# Patient Record
Sex: MALE | Race: WHITE | HISPANIC OR LATINO | Employment: OTHER | URBAN - METROPOLITAN AREA
[De-identification: names, ages, dates, MRNs, and addresses within clinical notes are randomized per-mention and may not be internally consistent; named-entity substitution may affect disease eponyms.]

---

## 2017-01-27 ENCOUNTER — HOSPITAL ENCOUNTER (INPATIENT)
Facility: MEDICAL CENTER | Age: 47
LOS: 2 days | DRG: 392 | End: 2017-01-30
Attending: EMERGENCY MEDICINE | Admitting: HOSPITALIST
Payer: COMMERCIAL

## 2017-01-27 DIAGNOSIS — K92.2 UPPER GI BLEED: ICD-10-CM

## 2017-01-27 PROCEDURE — 99285 EMERGENCY DEPT VISIT HI MDM: CPT

## 2017-01-27 ASSESSMENT — PAIN SCALES - GENERAL: PAINLEVEL_OUTOF10: 8

## 2017-01-27 NOTE — IP AVS SNAPSHOT
" After Visit Summary                                                                                                                  Name:Temo Bryan  Medical Record Number:[id number]  CSN: [id number]    YOB: 1970   Age: 46 y.o.  Sex: male  HT:1.765 m (5' 9.49\") WT: 91.2 kg (201 lb 1 oz)          Admit Date: 1/27/2017     Discharge Date:   Today's Date: 1/30/2017  Attending Doctor:  Justice Sykes M.D.                  Allergies:  Morphine            Discharge Instructions       Discharge Instructions    Discharged to home by car with self. Discharged via ambulance, hospital escort: Refused.  Special equipment needed: Not Applicable    Be sure to schedule a follow-up appointment with your primary care doctor or any specialists as instructed.     Discharge Plan:   Diet Plan: Discussed  Activity Level: Discussed  Smoking Cessation Offered: Patient Refused  Confirmed Follow up Appointment: Patient to Call and Schedule Appointment  Pneumococcal Vaccine Given - only chart on this line when given: Given (See MAR)  Influenza Vaccine Indication: Patient Refuses    I understand that a diet low in cholesterol, fat, and sodium is recommended for good health. Unless I have been given specific instructions below for another diet, I accept this instruction as my diet prescription.   Other diet: GI soft as tolerated    Special Instructions: None    · Is patient discharged on Warfarin / Coumadin?   No     · Is patient Post Blood Transfusion?  No    Depression / Suicide Risk    As you are discharged from this [facility] facility, it is important to learn how to keep safe from harming yourself.    Recognize the warning signs:  · Abrupt changes in personality, positive or negative- including increase in energy   · Giving away possessions  · Change in eating patterns- significant weight changes-  positive or negative  · Change in sleeping patterns- unable to sleep or sleeping all the time   · Unwillingness or " inability to communicate  · Depression  · Unusual sadness, discouragement and loneliness  · Talk of wanting to die  · Neglect of personal appearance   · Rebelliousness- reckless behavior  · Withdrawal from people/activities they love  · Confusion- inability to concentrate     If you or a loved one observes any of these behaviors or has concerns about self-harm, here's what you can do:  · Talk about it- your feelings and reasons for harming yourself  · Remove any means that you might use to hurt yourself (examples: pills, rope, extension cords, firearm)  · Get professional help from the community (Mental Health, Substance Abuse, psychological counseling)  · Do not be alone:Call your Safe Contact- someone whom you trust who will be there for you.  · Call your local CRISIS HOTLINE 258-7308 or 895-337-1979  · Call your local Children's Mobile Crisis Response Team [place] (850) 460-1795 or www.Illumagear  · Call the toll free National Suicide Prevention Hotlines   · National Suicide Prevention Lifeline 235-177-PKBE (9773)  · [employer] 800-SUICIDE (222-5739)    Gastrointestinal Bleeding  Gastrointestinal bleeding is bleeding somewhere along the path that food travels through the body (digestive tract). This path is anywhere between the mouth and the opening of the butt (anus). You may have blood in your throw up (vomit) or in your poop (stools). If there is a lot of bleeding, you may need to stay in the hospital.  HOME CARE  · Only take medicine as told by your doctor.  · Eat foods with fiber such as whole grains, fruits, and vegetables. You can also try eating 1 to 3 prunes a day.  · Drink enough fluids to keep your pee (urine) clear or pale yellow.  GET HELP RIGHT AWAY IF:   · Your bleeding gets worse.  · You feel dizzy, weak, or you pass out (faint).  · You have bad cramps in your back or belly (abdomen).  · You have large blood clumps (clots) in your poop.  · Your problems are getting  worse.  MAKE SURE YOU:   · Understand these instructions.  · Will watch your condition.  · Will get help right away if you are not doing well or get worse.     This information is not intended to replace advice given to you by your health care provider. Make sure you discuss any questions you have with your health care provider.     Document Released: 09/26/2009 Document Revised: 12/04/2013 Document Reviewed: 11/26/2012  [employer] Interactive Patient Education ©2016 [employer]        Follow-up Information     1. Follow up with Pcp Pt States None In 1 week.    Specialty:  Family Medicine        2. Follow up with Temo Anderson M.D. In 1 week.    Specialty:  Gastroenterology    Contact information    [address] [address]  [phone]          3. Follow up In 1 week.    [facility]  [facility] If needed, [facility] if necessary.         Discharge Medication Instructions:    Below are the medications your physician expects you to take upon discharge:    Review all your home medications and newly ordered medications with your doctor and/or pharmacist. Follow medication instructions as directed by your doctor and/or pharmacist.    Please keep your medication list with you and share with your physician.               Medication List      START taking these medications        Instructions    omeprazole 40 MG delayed-release capsule   Last time this was given:  40 mg on 1/30/2017  8:00 AM   Commonly known as:  PRILOSEC    Take 1 Cap by mouth 2 Times a Day.   Dose:  40 mg       sucralfate 1 GM/10ML Susp   Last time this was given:  1 g on 1/30/2017  5:47 AM   Commonly known as:  CARAFATE    Take 10 mL by mouth 4 Times a Day,Before Meals and at Bedtime for 14 days.   Dose:  1 g         CONTINUE taking these medications        Instructions    acamprosate 333 MG tablet   Commonly known as:  CAMPRAL    Take 666 mg by mouth 3 times a day.   Dose:  666 mg        calcium carbonate 500 MG Tabs   Commonly known as:  OS-JORDY 500    Take 1,000 mg by mouth every morning with breakfast.   Dose:  1000 mg       escitalopram 10 MG Tabs   Last time this was given:  10 mg on 1/30/2017  8:01 AM   Commonly known as:  LEXAPRO    Take 10 mg by mouth every day. Indications: Posttraumatic Stress Disorder   Dose:  10 mg       hydrOXYzine 25 MG Tabs   Last time this was given:  25 mg on 1/29/2017 11:21 PM   Commonly known as:  ATARAX    Take 25 mg by mouth 3 times a day as needed for Itching or Anxiety.   Dose:  25 mg       multivitamin Tabs   Last time this was given:  1 Tab on 1/30/2017  8:00 AM    Take 1 Tab by mouth every day.   Dose:  1 Tab       prazosin 2 MG Caps   Last time this was given:  1/28/2017  8:09 PM   Commonly known as:  MINIPRESS    Take 6 mg by mouth every evening. Indications: PTSD   Dose:  6 mg       thiamine 100 MG Tabs   Last time this was given:  100 mg on 1/30/2017  8:01 AM   Commonly known as:  THIAMINE    Take 100 mg by mouth every day.   Dose:  100 mg       tramadol 50 MG Tabs   Last time this was given:  100 mg on 1/29/2017 10:12 PM   Commonly known as:  ULTRAM    Take 100 mg by mouth every 6 hours as needed.   Dose:  100 mg       trazodone 150 MG Tabs   Last time this was given:  150 mg on 1/29/2017 10:03 PM   Commonly known as:  DESYREL    Take 150 mg by mouth every evening.   Dose:  150 mg               Instructions           Diet / Nutrition:    Follow any diet instructions given to you by your doctor or the dietician, including how much salt (sodium) you are allowed each day.    If you are overweight, talk to your doctor about a weight reduction plan.    Activity:    Remain physically active following your doctor's instructions about exercise and activity.    Rest often.     Any time you become even a little tired or short of breath, SIT DOWN and rest.    Worsening Symptoms:    Report any of the following signs and symptoms to the doctor's office  immediately:    *Pain of jaw, arm, or neck  *Chest pain not relieved by medication                               *Dizziness or loss of consciousness  *Difficulty breathing even when at rest   *More tired than usual                                       *Bleeding drainage or swelling of surgical site  *Swelling of feet, ankles, legs or stomach                 *Fever (>100ºF)  *Pink or blood tinged sputum  *Weight gain (3lbs/day or 5lbs /week)           *Shock from internal defibrillator (if applicable)  *Palpitations or irregular heartbeats                *Cool and/or numb extremities    Stroke Awareness    Common Risk Factors for Stroke include:    Age  Atrial Fibrillation  Carotid Artery Stenosis  Diabetes Mellitus  Excessive alcohol consumption  High blood pressure  Overweight   Physical inactivity  Smoking    Warning signs and symptoms of a stroke include:    *Sudden numbness or weakness of the face, arm or leg (especially on one side of the body).  *Sudden confusion, trouble speaking or understanding.  *Sudden trouble seeing in one or both eyes.  *Sudden trouble walking, dizziness, loss of balance or coordination.Sudden severe headache with no known cause.    It is very important to get treatment quickly when a stroke occurs. If you experience any of the above warning signs, call 911 immediately.                   Disclaimer         Quit Smoking / Tobacco Use:    I understand the use of any tobacco products increases my chance of suffering from future heart disease or stroke and could cause other illnesses which may shorten my life. Quitting the use of tobacco products is the single most important thing I can do to improve my health. For further information on smoking / tobacco cessation call a Toll Free Quit Line at 1-369.862.7317 (*National Cancer [place]) or 1-299.810.3698 (American Lung Association) or you can access the web based program at www.lungusa.org.    [place] Tobacco Users Help Line:  (661)  878-9352       Toll Free: 0-862-730-0530  Quit Tobacco Program [facility] (601)070-6435    Crisis Hotline:    [place] Crisis Hotline:  4-760-UMGOKWA or 1-357.990.8433    [place] Crisis Hotline:    1-275.757.8337 or 951-005-5880    Discharge Survey:   Thank you for choosing [facility]. We hope we did everything we could to make your hospital stay a pleasant one. You may be receiving a phone survey and we would appreciate your time and participation in answering the questions. Your input is very valuable to us in our efforts to improve our service to our patients and their families.        My signature on this form indicates that:    1. I have reviewed and understand the above information.  2. My questions regarding this information have been answered to my satisfaction.  3. I have formulated a plan with my discharge nurse to obtain my prescribed medications for home.                  Disclaimer         __________________________________                     __________       ________                       Patient Signature                                                 Date                    Time

## 2017-01-27 NOTE — IP AVS SNAPSHOT
" <p align=\"LEFT\"><IMG SRC=\"//[email or web address]$/[email or web address]\" alt=\"Image\" WIDTH=\"50%\" HEIGHT=\"200\" BORDER=\"\"></p>                   Name:Temo Bryan  Medical Record Number:[id number]  CSN: [id number]    YOB: 1970   Age: 46 y.o.  Sex: male  HT:1.765 m (5' 9.49\") WT: 91.2 kg (201 lb 1 oz)          Admit Date: 1/27/2017     Discharge Date:   Today's Date: 1/30/2017  Attending Doctor:  Justice Sykse M.D.                  Allergies:  Morphine          Follow-up Information     1. Follow up with Pcp Pt States None In 1 week.    Specialty:  Family Medicine        2. Follow up with Temo Anderson M.D. In 1 week.    Specialty:  Gastroenterology    Contact information    [address] [place] [address]  [phone]          3. Follow up In 1 week.    [facility]:  [facility]. If needed, [facility] if necessary.         Medication List      Take these Medications        Instructions    acamprosate 333 MG tablet   Commonly known as:  CAMPRAL    Take 666 mg by mouth 3 times a day.   Dose:  666 mg       calcium carbonate 500 MG Tabs   Commonly known as:  OS-JORDY 500    Take 1,000 mg by mouth every morning with breakfast.   Dose:  1000 mg       escitalopram 10 MG Tabs   Commonly known as:  LEXAPRO    Take 10 mg by mouth every day. Indications: Posttraumatic Stress Disorder   Dose:  10 mg       hydrOXYzine 25 MG Tabs   Commonly known as:  ATARAX    Take 25 mg by mouth 3 times a day as needed for Itching or Anxiety.   Dose:  25 mg       multivitamin Tabs    Take 1 Tab by mouth every day.   Dose:  1 Tab       omeprazole 40 MG delayed-release capsule   Commonly known as:  PRILOSEC    Take 1 Cap by mouth 2 Times a Day.   Dose:  40 mg       prazosin 2 MG Caps   Commonly known as:  MINIPRESS    Take 6 mg by mouth every evening. Indications: PTSD   Dose:  6 mg       sucralfate 1 GM/10ML Susp   Commonly known as:  CARAFATE    Take 10 mL by mouth 4 Times a Day,Before Meals " and at Bedtime for 14 days.   Dose:  1 g       thiamine 100 MG Tabs   Commonly known as:  THIAMINE    Take 100 mg by mouth every day.   Dose:  100 mg       tramadol 50 MG Tabs   Commonly known as:  ULTRAM    Take 100 mg by mouth every 6 hours as needed.   Dose:  100 mg       trazodone 150 MG Tabs   Commonly known as:  DESYREL    Take 150 mg by mouth every evening.   Dose:  150 mg

## 2017-01-27 NOTE — IP AVS SNAPSHOT
1/30/2017          Temo Bryan  [address]  [place] [address]    Dear Temo:    [facility] wants to ensure your discharge home is safe and you or your loved ones have had all your questions answered regarding your care after you leave the hospital.    You may receive a telephone call within two days of your discharge.  This call is to make certain you understand your discharge instructions as well as ensure we provided you with the best care possible during your stay with us.     The call will only last approximately 3-5 minutes and will be done by a nurse.    Once again, we want to ensure your discharge home is safe and that you have a clear understanding of any next steps in your care.  If you have any questions or concerns, please do not hesitate to contact us, we are here for you.  Thank you for choosing [facility] for your healthcare needs.    Sincerely,    Abisai Ragsdale    [facility]

## 2017-01-27 NOTE — IP AVS SNAPSHOT
Project 2020 Access Code: [email or web address]  Expires: 3/1/2017 12:32 PM    Your email address is not on file at [facility].  Email Addresses are required for you to sign up for Project 2020, please contact [phone] to verify your personal information and to provide your email address prior to attempting to register for Project 2020.    Temo Bryan  [address]  [place] [address]    Project 2020  A secure, online tool to manage your health information     [facility]’s Project 2020® is a secure, online tool that connects you to your personalized health information from the privacy of your home -- day or night - making it very easy for you to manage your healthcare. Once the activation process is completed, you can even access your medical information using the Project 2020 joshua, which is available for free in the Apple Joshua store or Google Play store.     To learn more about Project 2020, visit [email or web address]    There are two levels of access available (as shown below):   My Chart Features  [facility] [place] [facility] Primary Care Doctor [facility]  Specialists [facility]  Urgent  Care Non-[facility] Primary Care Doctor   Email your healthcare team securely and privately 24/7 X X X    Manage appointments: schedule your next appointment; view details of past/upcoming appointments X      Request prescription refills. X      View recent personal medical records, including lab and immunizations X X X X   View health record, including health history, allergies, medications X X X X   Read reports about your outpatient visits, procedures, consult and ER notes X X X X   See your discharge summary, which is a recap of your hospital and/or ER visit that includes your diagnosis, lab results, and care plan X X  X     How to register for [employer] [employer]:  Once your e-mail address has been verified, follow the following steps to sign up for [employer] [employer].     1. Go to  [email or web address]  2. Click on the Sign Up Now box, which takes you to the New Member Sign Up  page. You will need to provide the following information:  a. Enter your [employer] Access Code exactly as it appears at the top of this page. (You will not need to use this code after you’ve completed the sign-up process. If you do not sign up before the expiration date, you must request a new code.)   b. Enter your date of birth.   c. Enter your home email address.   d. Click Submit, and follow the next screen’s instructions.  3. Create a [employer] P[employer] ID. This will be your [employer] login ID and cannot be changed, so think of one that is secure and easy to remember.  4. Create a [facility] [employer] password. You can change your password at any time.  5. Enter your Password Reset Question and Answer. This can be used at a later time if you forget your password.   6. Enter your e-mail address. This allows you to receive e-mail notifications when new information is available in [employer].  7. Click Sign Up. You can now view your health information.    For assistance activating your [employer] account, call [phone]

## 2017-01-28 ENCOUNTER — RESOLUTE PROFESSIONAL BILLING HOSPITAL PROF FEE (OUTPATIENT)
Dept: HOSPITALIST | Facility: MEDICAL CENTER | Age: 47
End: 2017-01-28
Payer: COMMERCIAL

## 2017-01-28 PROBLEM — K92.2 GI BLEED: Status: ACTIVE | Noted: 2017-01-28

## 2017-01-28 LAB
ABO GROUP BLD: NORMAL
ABO GROUP BLD: NORMAL
ALBUMIN SERPL BCP-MCNC: 3.5 G/DL (ref 3.2–4.9)
ALBUMIN/GLOB SERPL: 1.2 G/DL
ALP SERPL-CCNC: 70 U/L (ref 30–99)
ALT SERPL-CCNC: 17 U/L (ref 2–50)
ANION GAP SERPL CALC-SCNC: 12 MMOL/L (ref 0–11.9)
AST SERPL-CCNC: 20 U/L (ref 12–45)
BASOPHILS # BLD AUTO: 0.7 % (ref 0–1.8)
BASOPHILS # BLD: 0.06 K/UL (ref 0–0.12)
BILIRUB SERPL-MCNC: 0.5 MG/DL (ref 0.1–1.5)
BLD GP AB SCN SERPL QL: NORMAL
BUN SERPL-MCNC: 21 MG/DL (ref 8–22)
CALCIUM SERPL-MCNC: 8.3 MG/DL (ref 8.5–10.5)
CHLORIDE SERPL-SCNC: 100 MMOL/L (ref 96–112)
CO2 SERPL-SCNC: 26 MMOL/L (ref 20–33)
CREAT SERPL-MCNC: 0.68 MG/DL (ref 0.5–1.4)
EKG IMPRESSION: NORMAL
EOSINOPHIL # BLD AUTO: 0.25 K/UL (ref 0–0.51)
EOSINOPHIL NFR BLD: 3 % (ref 0–6.9)
ERYTHROCYTE [DISTWIDTH] IN BLOOD BY AUTOMATED COUNT: 47.8 FL (ref 35.9–50)
GFR SERPL CREATININE-BSD FRML MDRD: >60 ML/MIN/1.73 M 2
GLOBULIN SER CALC-MCNC: 3 G/DL (ref 1.9–3.5)
GLUCOSE SERPL-MCNC: 100 MG/DL (ref 65–99)
HCT VFR BLD AUTO: 41.8 % (ref 42–52)
HGB BLD-MCNC: 13.7 G/DL (ref 14–18)
IMM GRANULOCYTES # BLD AUTO: 0.03 K/UL (ref 0–0.11)
IMM GRANULOCYTES NFR BLD AUTO: 0.4 % (ref 0–0.9)
LYMPHOCYTES # BLD AUTO: 3.35 K/UL (ref 1–4.8)
LYMPHOCYTES NFR BLD: 40 % (ref 22–41)
MCH RBC QN AUTO: 29.6 PG (ref 27–33)
MCHC RBC AUTO-ENTMCNC: 32.8 G/DL (ref 33.7–35.3)
MCV RBC AUTO: 90.3 FL (ref 81.4–97.8)
MONOCYTES # BLD AUTO: 0.66 K/UL (ref 0–0.85)
MONOCYTES NFR BLD AUTO: 7.9 % (ref 0–13.4)
NEUTROPHILS # BLD AUTO: 4.03 K/UL (ref 1.82–7.42)
NEUTROPHILS NFR BLD: 48 % (ref 44–72)
NRBC # BLD AUTO: 0 K/UL
NRBC BLD AUTO-RTO: 0 /100 WBC
PLATELET # BLD AUTO: 166 K/UL (ref 164–446)
PMV BLD AUTO: 10.3 FL (ref 9–12.9)
POTASSIUM SERPL-SCNC: 3.3 MMOL/L (ref 3.6–5.5)
PROT SERPL-MCNC: 6.5 G/DL (ref 6–8.2)
RBC # BLD AUTO: 4.63 M/UL (ref 4.7–6.1)
RH BLD: NORMAL
SODIUM SERPL-SCNC: 138 MMOL/L (ref 135–145)
WBC # BLD AUTO: 8.4 K/UL (ref 4.8–10.8)

## 2017-01-28 PROCEDURE — 700105 HCHG RX REV CODE 258: Performed by: EMERGENCY MEDICINE

## 2017-01-28 PROCEDURE — 700102 HCHG RX REV CODE 250 W/ 637 OVERRIDE(OP): Performed by: HOSPITALIST

## 2017-01-28 PROCEDURE — 88305 TISSUE EXAM BY PATHOLOGIST: CPT | Mod: 59

## 2017-01-28 PROCEDURE — 700111 HCHG RX REV CODE 636 W/ 250 OVERRIDE (IP)

## 2017-01-28 PROCEDURE — C9113 INJ PANTOPRAZOLE SODIUM, VIA: HCPCS | Performed by: HOSPITALIST

## 2017-01-28 PROCEDURE — C9113 INJ PANTOPRAZOLE SODIUM, VIA: HCPCS | Performed by: EMERGENCY MEDICINE

## 2017-01-28 PROCEDURE — 500066 HCHG BITE BLOCK, ECT: Performed by: INTERNAL MEDICINE

## 2017-01-28 PROCEDURE — 85025 COMPLETE CBC W/AUTO DIFF WBC: CPT

## 2017-01-28 PROCEDURE — 502240 HCHG MISC OR SUPPLY RC 0272: Performed by: INTERNAL MEDICINE

## 2017-01-28 PROCEDURE — 500122 HCHG BOVIE, BLADE: Performed by: INTERNAL MEDICINE

## 2017-01-28 PROCEDURE — 700102 HCHG RX REV CODE 250 W/ 637 OVERRIDE(OP): Performed by: INTERNAL MEDICINE

## 2017-01-28 PROCEDURE — HZ2ZZZZ DETOXIFICATION SERVICES FOR SUBSTANCE ABUSE TREATMENT: ICD-10-PCS | Performed by: HOSPITALIST

## 2017-01-28 PROCEDURE — 160009 HCHG ANES TIME/MIN: Performed by: INTERNAL MEDICINE

## 2017-01-28 PROCEDURE — 160048 HCHG OR STATISTICAL LEVEL 1-5: Performed by: INTERNAL MEDICINE

## 2017-01-28 PROCEDURE — 700102 HCHG RX REV CODE 250 W/ 637 OVERRIDE(OP)

## 2017-01-28 PROCEDURE — 0DB38ZX EXCISION OF LOWER ESOPHAGUS, VIA NATURAL OR ARTIFICIAL OPENING ENDOSCOPIC, DIAGNOSTIC: ICD-10-PCS | Performed by: INTERNAL MEDICINE

## 2017-01-28 PROCEDURE — 86901 BLOOD TYPING SEROLOGIC RH(D): CPT

## 2017-01-28 PROCEDURE — 96365 THER/PROPH/DIAG IV INF INIT: CPT

## 2017-01-28 PROCEDURE — 99223 1ST HOSP IP/OBS HIGH 75: CPT | Performed by: HOSPITALIST

## 2017-01-28 PROCEDURE — 700111 HCHG RX REV CODE 636 W/ 250 OVERRIDE (IP): Performed by: EMERGENCY MEDICINE

## 2017-01-28 PROCEDURE — 700101 HCHG RX REV CODE 250

## 2017-01-28 PROCEDURE — 96375 TX/PRO/DX INJ NEW DRUG ADDON: CPT

## 2017-01-28 PROCEDURE — A9270 NON-COVERED ITEM OR SERVICE: HCPCS

## 2017-01-28 PROCEDURE — 0DB68ZX EXCISION OF STOMACH, VIA NATURAL OR ARTIFICIAL OPENING ENDOSCOPIC, DIAGNOSTIC: ICD-10-PCS | Performed by: INTERNAL MEDICINE

## 2017-01-28 PROCEDURE — 93010 ELECTROCARDIOGRAM REPORT: CPT | Performed by: INTERNAL MEDICINE

## 2017-01-28 PROCEDURE — A9270 NON-COVERED ITEM OR SERVICE: HCPCS | Performed by: HOSPITALIST

## 2017-01-28 PROCEDURE — 93010 ELECTROCARDIOGRAM REPORT: CPT | Mod: 77 | Performed by: INTERNAL MEDICINE

## 2017-01-28 PROCEDURE — 93005 ELECTROCARDIOGRAM TRACING: CPT

## 2017-01-28 PROCEDURE — 86900 BLOOD TYPING SEROLOGIC ABO: CPT

## 2017-01-28 PROCEDURE — 110382 HCHG SHELL REV 271: Performed by: INTERNAL MEDICINE

## 2017-01-28 PROCEDURE — 160035 HCHG PACU - 1ST 60 MINS PHASE I: Performed by: INTERNAL MEDICINE

## 2017-01-28 PROCEDURE — 160002 HCHG RECOVERY MINUTES (STAT): Performed by: INTERNAL MEDICINE

## 2017-01-28 PROCEDURE — 770020 HCHG ROOM/CARE - TELE (206)

## 2017-01-28 PROCEDURE — 80053 COMPREHEN METABOLIC PANEL: CPT

## 2017-01-28 PROCEDURE — A9270 NON-COVERED ITEM OR SERVICE: HCPCS | Performed by: INTERNAL MEDICINE

## 2017-01-28 PROCEDURE — 700105 HCHG RX REV CODE 258: Performed by: HOSPITALIST

## 2017-01-28 PROCEDURE — 93005 ELECTROCARDIOGRAM TRACING: CPT | Performed by: HOSPITALIST

## 2017-01-28 PROCEDURE — 700111 HCHG RX REV CODE 636 W/ 250 OVERRIDE (IP): Performed by: HOSPITALIST

## 2017-01-28 PROCEDURE — 700101 HCHG RX REV CODE 250: Performed by: HOSPITALIST

## 2017-01-28 PROCEDURE — 88312 SPECIAL STAINS GROUP 1: CPT

## 2017-01-28 PROCEDURE — 160203 HCHG ENDO MINUTES - 1ST 30 MINS LEVEL 4: Performed by: INTERNAL MEDICINE

## 2017-01-28 PROCEDURE — 302128 INFUSION PUMP: Performed by: EMERGENCY MEDICINE

## 2017-01-28 PROCEDURE — 86850 RBC ANTIBODY SCREEN: CPT

## 2017-01-28 PROCEDURE — 96368 THER/DIAG CONCURRENT INF: CPT

## 2017-01-28 PROCEDURE — 302128 INFUSION PUMP: Performed by: HOSPITALIST

## 2017-01-28 PROCEDURE — 96366 THER/PROPH/DIAG IV INF ADDON: CPT

## 2017-01-28 PROCEDURE — A4606 OXYGEN PROBE USED W OXIMETER: HCPCS | Performed by: INTERNAL MEDICINE

## 2017-01-28 RX ORDER — NICOTINE 21 MG/24HR
1 PATCH, TRANSDERMAL 24 HOURS TRANSDERMAL EVERY 24 HOURS
COMMUNITY
End: 2017-01-28

## 2017-01-28 RX ORDER — HYDROXYZINE HYDROCHLORIDE 25 MG/1
25 TABLET, FILM COATED ORAL 3 TIMES DAILY PRN
COMMUNITY

## 2017-01-28 RX ORDER — LORAZEPAM 1 MG/1
0.5 TABLET ORAL EVERY 4 HOURS PRN
Status: DISCONTINUED | OUTPATIENT
Start: 2017-01-28 | End: 2017-01-30 | Stop reason: HOSPADM

## 2017-01-28 RX ORDER — ESCITALOPRAM OXALATE 10 MG/1
10 TABLET ORAL DAILY
Status: DISCONTINUED | OUTPATIENT
Start: 2017-01-28 | End: 2017-01-30 | Stop reason: HOSPADM

## 2017-01-28 RX ORDER — SUCRALFATE ORAL 1 G/10ML
1 SUSPENSION ORAL
Status: DISCONTINUED | OUTPATIENT
Start: 2017-01-28 | End: 2017-01-30 | Stop reason: HOSPADM

## 2017-01-28 RX ORDER — PRAZOSIN HYDROCHLORIDE 2 MG/1
6 CAPSULE ORAL NIGHTLY
COMMUNITY

## 2017-01-28 RX ORDER — OMEPRAZOLE 20 MG/1
40 CAPSULE, DELAYED RELEASE ORAL 2 TIMES DAILY
Status: DISCONTINUED | OUTPATIENT
Start: 2017-01-28 | End: 2017-01-30 | Stop reason: HOSPADM

## 2017-01-28 RX ORDER — ONDANSETRON 2 MG/ML
4 INJECTION INTRAMUSCULAR; INTRAVENOUS EVERY 4 HOURS PRN
Status: DISCONTINUED | OUTPATIENT
Start: 2017-01-28 | End: 2017-01-28

## 2017-01-28 RX ORDER — OXYCODONE AND ACETAMINOPHEN 10; 325 MG/1; MG/1
TABLET ORAL
Status: COMPLETED
Start: 2017-01-28 | End: 2017-01-28

## 2017-01-28 RX ORDER — LORAZEPAM 2 MG/ML
1.5 INJECTION INTRAMUSCULAR
Status: DISCONTINUED | OUTPATIENT
Start: 2017-01-28 | End: 2017-01-30 | Stop reason: HOSPADM

## 2017-01-28 RX ORDER — PROMETHAZINE HYDROCHLORIDE 25 MG/1
12.5-25 TABLET ORAL EVERY 4 HOURS PRN
Status: DISCONTINUED | OUTPATIENT
Start: 2017-01-28 | End: 2017-01-30 | Stop reason: HOSPADM

## 2017-01-28 RX ORDER — TRAMADOL HYDROCHLORIDE 50 MG/1
100 TABLET ORAL EVERY 6 HOURS PRN
COMMUNITY

## 2017-01-28 RX ORDER — TRAZODONE HYDROCHLORIDE 150 MG/1
150 TABLET ORAL NIGHTLY
COMMUNITY

## 2017-01-28 RX ORDER — ACAMPROSATE CALCIUM 333 MG/1
666 TABLET, DELAYED RELEASE ORAL 3 TIMES DAILY
Status: DISCONTINUED | OUTPATIENT
Start: 2017-01-28 | End: 2017-01-30 | Stop reason: HOSPADM

## 2017-01-28 RX ORDER — FOLIC ACID 1 MG/1
1 TABLET ORAL DAILY
Status: DISCONTINUED | OUTPATIENT
Start: 2017-01-29 | End: 2017-01-30 | Stop reason: HOSPADM

## 2017-01-28 RX ORDER — PROMETHAZINE HYDROCHLORIDE 25 MG/1
12.5-25 SUPPOSITORY RECTAL EVERY 4 HOURS PRN
Status: DISCONTINUED | OUTPATIENT
Start: 2017-01-28 | End: 2017-01-30 | Stop reason: HOSPADM

## 2017-01-28 RX ORDER — LORAZEPAM 1 MG/1
1 TABLET ORAL EVERY 4 HOURS PRN
Status: DISCONTINUED | OUTPATIENT
Start: 2017-01-28 | End: 2017-01-30 | Stop reason: HOSPADM

## 2017-01-28 RX ORDER — HYDROXYZINE HYDROCHLORIDE 25 MG/1
25 TABLET, FILM COATED ORAL 3 TIMES DAILY PRN
Status: DISCONTINUED | OUTPATIENT
Start: 2017-01-28 | End: 2017-01-30 | Stop reason: HOSPADM

## 2017-01-28 RX ORDER — ONDANSETRON 2 MG/ML
4 INJECTION INTRAMUSCULAR; INTRAVENOUS EVERY 4 HOURS PRN
Status: DISCONTINUED | OUTPATIENT
Start: 2017-01-28 | End: 2017-01-30 | Stop reason: HOSPADM

## 2017-01-28 RX ORDER — LORAZEPAM 2 MG/ML
1 INJECTION INTRAMUSCULAR
Status: DISCONTINUED | OUTPATIENT
Start: 2017-01-28 | End: 2017-01-30 | Stop reason: HOSPADM

## 2017-01-28 RX ORDER — LORAZEPAM 1 MG/1
2 TABLET ORAL
Status: DISCONTINUED | OUTPATIENT
Start: 2017-01-28 | End: 2017-01-30 | Stop reason: HOSPADM

## 2017-01-28 RX ORDER — SODIUM CHLORIDE AND POTASSIUM CHLORIDE 150; 900 MG/100ML; MG/100ML
INJECTION, SOLUTION INTRAVENOUS CONTINUOUS
Status: DISCONTINUED | OUTPATIENT
Start: 2017-01-28 | End: 2017-01-30 | Stop reason: HOSPADM

## 2017-01-28 RX ORDER — ESCITALOPRAM OXALATE 10 MG/1
10 TABLET ORAL DAILY
COMMUNITY

## 2017-01-28 RX ORDER — TRAZODONE HYDROCHLORIDE 50 MG/1
150 TABLET ORAL NIGHTLY
Status: DISCONTINUED | OUTPATIENT
Start: 2017-01-28 | End: 2017-01-30 | Stop reason: HOSPADM

## 2017-01-28 RX ORDER — LORAZEPAM 2 MG/ML
0.5 INJECTION INTRAMUSCULAR EVERY 4 HOURS PRN
Status: DISCONTINUED | OUTPATIENT
Start: 2017-01-28 | End: 2017-01-30 | Stop reason: HOSPADM

## 2017-01-28 RX ORDER — LORAZEPAM 1 MG/1
3 TABLET ORAL
Status: DISCONTINUED | OUTPATIENT
Start: 2017-01-28 | End: 2017-01-30 | Stop reason: HOSPADM

## 2017-01-28 RX ORDER — ACAMPROSATE CALCIUM 333 MG/1
666 TABLET, DELAYED RELEASE ORAL 3 TIMES DAILY
COMMUNITY

## 2017-01-28 RX ORDER — THIAMINE MONONITRATE (VIT B1) 100 MG
100 TABLET ORAL DAILY
Status: DISCONTINUED | OUTPATIENT
Start: 2017-01-29 | End: 2017-01-30 | Stop reason: HOSPADM

## 2017-01-28 RX ORDER — CALCIUM CARBONATE 500(1250)
1000 TABLET ORAL
COMMUNITY

## 2017-01-28 RX ORDER — NICOTINE 21 MG/24HR
1 PATCH, TRANSDERMAL 24 HOURS TRANSDERMAL EVERY 24 HOURS
Status: DISCONTINUED | OUTPATIENT
Start: 2017-01-28 | End: 2017-01-30 | Stop reason: HOSPADM

## 2017-01-28 RX ORDER — ONDANSETRON 4 MG/1
4 TABLET, ORALLY DISINTEGRATING ORAL EVERY 4 HOURS PRN
Status: DISCONTINUED | OUTPATIENT
Start: 2017-01-28 | End: 2017-01-30 | Stop reason: HOSPADM

## 2017-01-28 RX ORDER — LORAZEPAM 2 MG/ML
2 INJECTION INTRAMUSCULAR
Status: DISCONTINUED | OUTPATIENT
Start: 2017-01-28 | End: 2017-01-30 | Stop reason: HOSPADM

## 2017-01-28 RX ORDER — THIAMINE MONONITRATE (VIT B1) 100 MG
100 TABLET ORAL DAILY
COMMUNITY

## 2017-01-28 RX ORDER — TRAMADOL HYDROCHLORIDE 50 MG/1
100 TABLET ORAL EVERY 6 HOURS PRN
Status: DISCONTINUED | OUTPATIENT
Start: 2017-01-28 | End: 2017-01-30 | Stop reason: HOSPADM

## 2017-01-28 RX ORDER — LORAZEPAM 1 MG/1
4 TABLET ORAL
Status: DISCONTINUED | OUTPATIENT
Start: 2017-01-28 | End: 2017-01-30 | Stop reason: HOSPADM

## 2017-01-28 RX ADMIN — TRAMADOL HYDROCHLORIDE 100 MG: 50 TABLET, COATED ORAL at 04:34

## 2017-01-28 RX ADMIN — OXYCODONE HYDROCHLORIDE AND ACETAMINOPHEN 1 TABLET: 10; 325 TABLET ORAL at 11:20

## 2017-01-28 RX ADMIN — PRAZOSIN HYDROCHLORIDE 6 MG: 1 CAPSULE ORAL at 20:09

## 2017-01-28 RX ADMIN — OCTREOTIDE ACETATE 50 MCG/HR: 200 INJECTION, SOLUTION INTRAVENOUS; SUBCUTANEOUS at 01:36

## 2017-01-28 RX ADMIN — OMEPRAZOLE 40 MG: 20 CAPSULE, DELAYED RELEASE ORAL at 12:09

## 2017-01-28 RX ADMIN — LORAZEPAM 1 MG: 1 TABLET ORAL at 12:09

## 2017-01-28 RX ADMIN — SODIUM CHLORIDE 80 MG: 9 INJECTION, SOLUTION INTRAVENOUS at 01:36

## 2017-01-28 RX ADMIN — SUCRALFATE 1 G: 1 SUSPENSION ORAL at 20:07

## 2017-01-28 RX ADMIN — LORAZEPAM 1.5 MG: 2 INJECTION INTRAMUSCULAR; INTRAVENOUS at 21:21

## 2017-01-28 RX ADMIN — LORAZEPAM 1 MG: 2 INJECTION INTRAMUSCULAR; INTRAVENOUS at 20:07

## 2017-01-28 RX ADMIN — TRAZODONE HYDROCHLORIDE 150 MG: 50 TABLET ORAL at 20:08

## 2017-01-28 RX ADMIN — SODIUM CHLORIDE 8 MG/HR: 9 INJECTION, SOLUTION INTRAVENOUS at 04:34

## 2017-01-28 RX ADMIN — LORAZEPAM 1 MG: 1 TABLET ORAL at 04:34

## 2017-01-28 RX ADMIN — LORAZEPAM 1 MG: 1 TABLET ORAL at 16:05

## 2017-01-28 RX ADMIN — POTASSIUM CHLORIDE: 2 INJECTION, SOLUTION, CONCENTRATE INTRAVENOUS at 07:41

## 2017-01-28 RX ADMIN — ONDANSETRON 4 MG: 2 INJECTION, SOLUTION INTRAMUSCULAR; INTRAVENOUS at 01:36

## 2017-01-28 RX ADMIN — SUCRALFATE 1 G: 1 SUSPENSION ORAL at 12:09

## 2017-01-28 RX ADMIN — SUCRALFATE 1 G: 1 SUSPENSION ORAL at 16:05

## 2017-01-28 RX ADMIN — OMEPRAZOLE 40 MG: 20 CAPSULE, DELAYED RELEASE ORAL at 20:08

## 2017-01-28 RX ADMIN — LORAZEPAM 1 MG: 1 TABLET ORAL at 07:41

## 2017-01-28 RX ADMIN — ESCITALOPRAM OXALATE 10 MG: 10 TABLET ORAL at 07:41

## 2017-01-28 RX ADMIN — POTASSIUM CHLORIDE AND SODIUM CHLORIDE: 900; 150 INJECTION, SOLUTION INTRAVENOUS at 07:41

## 2017-01-28 RX ADMIN — LORAZEPAM 1.5 MG: 2 INJECTION INTRAMUSCULAR; INTRAVENOUS at 23:36

## 2017-01-28 RX ADMIN — LORAZEPAM 1.5 MG: 2 INJECTION INTRAMUSCULAR; INTRAVENOUS at 22:21

## 2017-01-28 RX ADMIN — POTASSIUM CHLORIDE AND SODIUM CHLORIDE: 900; 150 INJECTION, SOLUTION INTRAVENOUS at 23:40

## 2017-01-28 ASSESSMENT — LIFESTYLE VARIABLES
ORIENTATION AND CLOUDING OF SENSORIUM: ORIENTED AND CAN DO SERIAL ADDITIONS
AGITATION: NORMAL ACTIVITY
ORIENTATION AND CLOUDING OF SENSORIUM: ORIENTED AND CAN DO SERIAL ADDITIONS
DO YOU DRINK ALCOHOL: YES
NAUSEA AND VOMITING: MILD NAUSEA WITH NO VOMITING
TREMOR: *
ORIENTATION AND CLOUDING OF SENSORIUM: ORIENTED AND CAN DO SERIAL ADDITIONS
TOTAL SCORE: 20
TOTAL SCORE: 21
TACTILE DISTURBANCES: VERY MILD ITCHING, PINS AND NEEDLES SENSATION, BURNING OR NUMBNESS
VISUAL DISTURBANCES: VERY MILD SENSITIVITY
ANXIETY: MILDLY ANXIOUS
AUDITORY DISTURBANCES: VERY MILD HARSHNESS OR ABILITY TO FRIGHTEN
HEADACHE, FULLNESS IN HEAD: MODERATE
HOW MANY TIMES IN THE PAST YEAR HAVE YOU HAD 5 OR MORE DRINKS IN A DAY: 300
TOTAL SCORE: 12
NAUSEA AND VOMITING: MILD NAUSEA WITH NO VOMITING
VISUAL DISTURBANCES: NOT PRESENT
NAUSEA AND VOMITING: *
TOTAL SCORE: 10
AGITATION: NORMAL ACTIVITY
ANXIETY: MILDLY ANXIOUS
DOES PATIENT WANT TO STOP DRINKING: YES
NAUSEA AND VOMITING: MILD NAUSEA WITH NO VOMITING
HAVE YOU EVER FELT YOU SHOULD CUT DOWN ON YOUR DRINKING: YES
VISUAL DISTURBANCES: NOT PRESENT
ANXIETY: MODERATELY ANXIOUS OR GUARDED, SO ANXIETY IS INFERRED
TOTAL SCORE: 9
TOTAL SCORE: VERY MILD ITCHING, PINS AND NEEDLES SENSATION, BURNING OR NUMBNESS
HEADACHE, FULLNESS IN HEAD: MILD
TOTAL SCORE: 21
ORIENTATION AND CLOUDING OF SENSORIUM: ORIENTED AND CAN DO SERIAL ADDITIONS
AGITATION: MODERATELY FIDGETY AND RESTLESS
PAROXYSMAL SWEATS: *
ANXIETY: MILDLY ANXIOUS
AUDITORY DISTURBANCES: NOT PRESENT
ORIENTATION AND CLOUDING OF SENSORIUM: ORIENTED AND CAN DO SERIAL ADDITIONS
TOTAL SCORE: 4
ANXIETY: MILDLY ANXIOUS
VISUAL DISTURBANCES: VERY MILD SENSITIVITY
AUDITORY DISTURBANCES: MILD HARSHNESS OR ABILITY TO FRIGHTEN
TOTAL SCORE: 6
CONSUMPTION TOTAL: POSITIVE
TOTAL SCORE: VERY MILD ITCHING, PINS AND NEEDLES SENSATION, BURNING OR NUMBNESS
EVER HAD A DRINK FIRST THING IN THE MORNING TO STEADY YOUR NERVES TO GET RID OF A HANGOVER: YES
AUDITORY DISTURBANCES: NOT PRESENT
AUDITORY DISTURBANCES: NOT PRESENT
VISUAL DISTURBANCES: MILD SENSITIVITY
EVER FELT BAD OR GUILTY ABOUT YOUR DRINKING: YES
AGITATION: MODERATELY FIDGETY AND RESTLESS
NAUSEA AND VOMITING: MILD NAUSEA WITH NO VOMITING
PAROXYSMAL SWEATS: BARELY PERCEPTIBLE SWEATING. PALMS MOIST
TREMOR: *
NAUSEA AND VOMITING: *
AUDITORY DISTURBANCES: MILD HARSHNESS OR ABILITY TO FRIGHTEN
TACTILE DISTURBANCES: VERY MILD ITCHING, PINS AND NEEDLES SENSATION, BURNING OR NUMBNESS
PAROXYSMAL SWEATS: *
VISUAL DISTURBANCES: NOT PRESENT
AUDITORY DISTURBANCES: NOT PRESENT
TREMOR: *
NAUSEA AND VOMITING: *
HEADACHE, FULLNESS IN HEAD: MODERATE
PAROXYSMAL SWEATS: BARELY PERCEPTIBLE SWEATING. PALMS MOIST
ANXIETY: MODERATELY ANXIOUS OR GUARDED, SO ANXIETY IS INFERRED
AGITATION: NORMAL ACTIVITY
AGITATION: NORMAL ACTIVITY
HEADACHE, FULLNESS IN HEAD: VERY MILD
ORIENTATION AND CLOUDING OF SENSORIUM: ORIENTED AND CAN DO SERIAL ADDITIONS
AGITATION: NORMAL ACTIVITY
PAROXYSMAL SWEATS: *
AUDITORY DISTURBANCES: MILD HARSHNESS OR ABILITY TO FRIGHTEN
TOTAL SCORE: 8
TREMOR: TREMOR NOT VISIBLE BUT CAN BE FELT, FINGERTIP TO FINGERTIP
PAROXYSMAL SWEATS: *
ANXIETY: MODERATELY ANXIOUS OR GUARDED, SO ANXIETY IS INFERRED
ANXIETY: MILDLY ANXIOUS
AGITATION: MODERATELY FIDGETY AND RESTLESS
NAUSEA AND VOMITING: MILD NAUSEA WITH NO VOMITING
PAROXYSMAL SWEATS: BARELY PERCEPTIBLE SWEATING. PALMS MOIST
VISUAL DISTURBANCES: NOT PRESENT
PAROXYSMAL SWEATS: BARELY PERCEPTIBLE SWEATING. PALMS MOIST
TREMOR: NO TREMOR
EVER_SMOKED: YES
TOTAL SCORE: 4
TOTAL SCORE: VERY MILD ITCHING, PINS AND NEEDLES SENSATION, BURNING OR NUMBNESS
TREMOR: *
ORIENTATION AND CLOUDING OF SENSORIUM: ORIENTED AND CAN DO SERIAL ADDITIONS
TREMOR: *
AVERAGE NUMBER OF DAYS PER WEEK YOU HAVE A DRINK CONTAINING ALCOHOL: 7
HEADACHE, FULLNESS IN HEAD: MILD
HEADACHE, FULLNESS IN HEAD: MODERATE
HAVE PEOPLE ANNOYED YOU BY CRITICIZING YOUR DRINKING: YES
TREMOR: *
VISUAL DISTURBANCES: NOT PRESENT
TACTILE DISTURBANCES: VERY MILD ITCHING, PINS AND NEEDLES SENSATION, BURNING OR NUMBNESS
ORIENTATION AND CLOUDING OF SENSORIUM: ORIENTED AND CAN DO SERIAL ADDITIONS
ON A TYPICAL DAY WHEN YOU DRINK ALCOHOL HOW MANY DRINKS DO YOU HAVE: 15
TOTAL SCORE: 4
TOTAL SCORE: VERY MILD ITCHING, PINS AND NEEDLES SENSATION, BURNING OR NUMBNESS

## 2017-01-28 ASSESSMENT — PAIN SCALES - GENERAL
PAINLEVEL_OUTOF10: 6
PAINLEVEL_OUTOF10: 5
PAINLEVEL_OUTOF10: 3
PAINLEVEL_OUTOF10: 7

## 2017-01-28 NOTE — OR NURSING
NO CHANGE IN PT STATUS. PT AXOX4. SHARMA. STRONG .EQUAL. VSS. TRANSPORTED TO [facility] 8 W TRANSPORT.

## 2017-01-28 NOTE — ED PROVIDER NOTES
"ED Provider Note    Scribed for Roman Freed M.D. by Luigi Jansen. 1/27/2017, 11:31 PM.    Primary care provider: Pcp Pt States None  Means of arrival: Ambulance  History obtained from: Patient  History limited by: none    CHIEF COMPLAINT  Chief Complaint   Patient presents with   • Upper GI Bleed     BLOOD IN VOMIT X1 DAY     HPI  Temo Bryan is a 46 y.o. male who presents to the Emergency Department complaining of persistent hematemesis, onset last night. He reports that his vomit is majorly blood, about 0.5 a cup per episode, mixed in with a small amount of emesis. Patient reports that he initially stopped drinking 37 days ago. 4 days ago he relapsed and and began to persistently vomit afterwards. The patient's last drink was 8 PM today after drinking a fifth of vodka. Patient reports that he has taken some Ibuprofen for withdrawal headache and to treat his epigastric abdominal pain with minimal relief. He denies over use of Ibuprofen. Patient has been otherwise compliant with his medications. [facility], Octreotide and they were concerned for alcohol gastritis vs peptic ulcer disease, and additional     REVIEW OF SYSTEMS  See HPI for further details. All other systems are negative.     PAST MEDICAL HISTORY  No pertinent past medical history     SURGICAL HISTORY  patient denies any surgical history    SOCIAL HISTORY  Social History   Substance Use Topics   • Alcohol Use: Yes. 1/5th a day.       FAMILY HISTORY  No pertinent family history     CURRENT MEDICATIONS  Reviewed.  See Encounter Summary.     ALLERGIES  Allergies   Allergen Reactions   • Morphine Itching     PHYSICAL EXAM  VITAL SIGNS: Temp(Src) 37.1 °C (98.8 °F)  Ht 1.765 m (5' 9.49\")  Wt 86.637 kg (191 lb)  BMI 27.81 kg/m2  Constitutional: Alert in no apparent distress.  HENT: No signs of trauma, Bilateral external ears normal, Nose normal.   Eyes: Pupils are equal and " reactive, Conjunctiva normal, Non-icteric.   Neck: Normal range of motion, No tenderness, Supple, No stridor.   Lymphatic: No lymphadenopathy noted.   Cardiovascular: Regular rate and rhythm, no murmurs.   Thorax & Lungs: Normal breath sounds, No respiratory distress, No wheezing, No chest tenderness.   Abdomen: Bowel sounds normal, Soft, minimal mid epigastric tenderness, No masses, No pulsatile masses. No peritoneal signs.  Skin: Warm, Dry, No erythema, No rash.   Back: No bony tenderness, No CVA tenderness.   Extremities: Intact distal pulses, No edema, No tenderness, No cyanosis  Musculoskeletal: Good range of motion in all major joints. No tenderness to palpation or major deformities noted.   Neurologic: Alert , Normal motor function, Normal sensory function, No focal deficits noted.   Psychiatric: Affect normal, Judgment normal, Mood normal.     DIAGNOSTIC STUDIES / PROCEDURES   LABS  Results for orders placed or performed during the hospital encounter of 01/27/17   CBC WITH DIFFERENTIAL   Result Value Ref Range    WBC 8.4 4.8 - 10.8 K/uL    RBC 4.63 (L) 4.70 - 6.10 M/uL    Hemoglobin 13.7 (L) 14.0 - 18.0 g/dL    Hematocrit 41.8 (L) 42.0 - 52.0 %    MCV 90.3 81.4 - 97.8 fL    MCH 29.6 27.0 - 33.0 pg    MCHC 32.8 (L) 33.7 - 35.3 g/dL    RDW 47.8 35.9 - 50.0 fL    Platelet Count 166 164 - 446 K/uL    MPV 10.3 9.0 - 12.9 fL    Neutrophils-Polys 48.00 44.00 - 72.00 %    Lymphocytes 40.00 22.00 - 41.00 %    Monocytes 7.90 0.00 - 13.40 %    Eosinophils 3.00 0.00 - 6.90 %    Basophils 0.70 0.00 - 1.80 %    Immature Granulocytes 0.40 0.00 - 0.90 %    Nucleated RBC 0.00 /100 WBC    Neutrophils (Absolute) 4.03 1.82 - 7.42 K/uL    Lymphs (Absolute) 3.35 1.00 - 4.80 K/uL    Monos (Absolute) 0.66 0.00 - 0.85 K/uL    Eos (Absolute) 0.25 0.00 - 0.51 K/uL    Baso (Absolute) 0.06 0.00 - 0.12 K/uL    Immature Granulocytes (abs) 0.03 0.00 - 0.11 K/uL    NRBC (Absolute) 0.00 K/uL   COMP METABOLIC PANEL   Result Value Ref Range     "Sodium 138 135 - 145 mmol/L    Potassium 3.3 (L) 3.6 - 5.5 mmol/L    Chloride 100 96 - 112 mmol/L    Co2 26 20 - 33 mmol/L    Anion Gap 12.0 (H) 0.0 - 11.9    Glucose 100 (H) 65 - 99 mg/dL    Bun 21 8 - 22 mg/dL    Creatinine 0.68 0.50 - 1.40 mg/dL    Calcium 8.3 (L) 8.5 - 10.5 mg/dL    AST(SGOT) 20 12 - 45 U/L    ALT(SGPT) 17 2 - 50 U/L    Alkaline Phosphatase 70 30 - 99 U/L    Total Bilirubin 0.5 0.1 - 1.5 mg/dL    Albumin 3.5 3.2 - 4.9 g/dL    Total Protein 6.5 6.0 - 8.2 g/dL    Globulin 3.0 1.9 - 3.5 g/dL    A-G Ratio 1.2 g/dL   ESTIMATED GFR   Result Value Ref Range    GFR If African American >60 >60 mL/min/1.73 m 2    GFR If Non African American >60 >60 mL/min/1.73 m 2     All labs were reviewed by me.    COURSE & MEDICAL DECISION MAKING  Pertinent Labs & Imaging studies reviewed. (See chart for details)      11:31 PM - Patient seen and examined at bedside. Ordered CBC, CMP, COD, and fluid occult blood to evaluate his symptoms.     12:08 AM - The case was discussed with Dr. Delmi BOLDEN. He is aware of the patient and agree to consult and recommend the patient be admitted to the Hospitalist.     12:48 AM - I discussed the case with Dr. Hand Hospitalist. She is aware of the patient and agrees to admit her into  Her care.     Decision Making:  This is a 46 y.o. year old male who presents with upper GI bleed with report of \"massive hemoptysis \"[facility]. Patient transferred to this hospital due to ongoing GI capabilities. Patient does admit to chronic alcohol abuse. No psychiatric to decrease his alcohol consumption and no started to have some withdrawals and so he then went back to drinking. He has a different a 5th of vodka [facility] which was also prior to transfer here. Patient currently complaining of some diffuse generalized abdominal discomfort. He has not had any recurrent hematemesis since [facility] although he does note approximately 1-2 cups of bloody emesis in the " last 24 hours. He denies history of significant alcohol cirrhosis or known esophageal varices. [facility] physician stated that he felt that this may represent an alcoholic gastritis versus peptic ulcer disease. Given the patient's history to me there is an additional possibility of this Adele-Larson tear. The patient has had continued Pepcid and octreotide as per GI after their consultation by me. I discussed the case with the medicine service after repeat H&H shows no further downtrending. They are no longer inpatient care. Patient is understanding of today's evaluation, findings and plan.      DISPOSITION:  Patient will be brought into the hospital for inpatient care of upper GIB.       FINAL IMPRESSION  1. Upper GI bleed     2. Chronic alcohol abuse     Luigi GALLO (Scribe), am scribing for, and in the presence of, Roman Freed M.D..    Electronically signed by: Luigi Jansen (Scribe), 1/27/2017    Roman GALLO M.D. personally performed the services described in this documentation, as scribed by Luigi Jansen in my presence, and it is both accurate and complete.    The note accurately reflects work and decisions made by me.  Roman Freed  1/28/2017  1:18 AM

## 2017-01-28 NOTE — H&P
CHIEF COMPLAINT:  Vomiting blood.    [facility]    HISTORY OF PRESENT ILLNESS:  This is a 46-year-old man with a history of   alcoholism who had been clean and sober for 47 days and then 4 days ago, he   relapsed and started drinking, but then he started vomiting afterwards.  He   said he was vomiting continuously.  He drinks a fifth of vodka about 24 hours   ago followed by the vomiting.  He has also been taking ibuprofen as he has   been having headaches.  He has been taking his other medications as usual.    The patient has never had an alcohol withdrawal seizure.  He is tremulous.  He   states that the vomitus was not initially bloody, but then after multiple   episodes of vomiting, it became bright red.  He did not have any coffee-ground   emesis or vomiting of clots.  The patient is color blind and thus his family   did tell him about the color of the vomit.  He has not to his knowledge had   any melena or bright red blood per rectum.  He has not had any fever or   chills.  He is having upper abdominal pain.  He has not had any jaundice or   right upper quadrant pain.    REVIEW OF SYSTEMS:  A complete review of systems was performed and other than   what is stated in history present illness is otherwise negative.    PAST MEDICAL HISTORY:  Significant for PTSD and alcoholism.    CURRENT MEDICATIONS:  1.  Acamprosate 666 mg 3 times daily.  2.  Calcium carbonate 1000 mg every morning.  3.  Escitalopram 10 mg daily.  4.  Hydroxyzine 25 mg 3 times daily as needed for itching or anxiety.  5.  Nicotine patch 21 mg every 24 hours to skin.  6.  Prazosin 6 mg every evening.  7.  Thiamine 100 mg daily.  8.  Tramadol 100 mg every 6 hours as needed for pain.  9.  Trazodone 150 mg every evening.    PAST SURGICAL HISTORY:  None.    FAMILY HISTORY:  Positive for psychiatric issues.    SOCIAL HISTORY:  He is a daily smoker.  He was previously drinking a fifth of   whiskey per day and last drink was within the  last 24 hours.  No illicit drug   use.  He is .  He lives in the community with his family.    ALLERGIES:  MORPHINE.    PHYSICAL EXAMINATION:  VITAL SIGNS:  Temperature 98.8 degrees, heart rate _____, respirations 13, and   blood pressure 115/73.  GENERAL:  This is a well-developed, well-nourished man.  He is awake and   alert, oriented x3, pleasant, cooperative with the examination.  HEENT:  Normocephalic and atraumatic.  Pupils are equal and reactive.  Sclerae   are nonicteric.  Oropharynx is clear.  Mucous membranes are moist.  The   palate is not jaundiced.  NECK:  Supple, without lymphadenopathy.  No jugular venous distension is   present.  Trachea is midline without stridor.  CHEST:  Clear to auscultation bilaterally without rales, rhonchi or wheezes.    No tachypnea or accessory muscle use is noted.  CARDIOVASCULAR:  Regular rate, not tachycardic.  No ventricular heave is   present.  No murmur, rub or gallop.  Radial pulses 2+ and symmetric.    Capillary refill is normal.  ABDOMEN:  Tender in the epigastrium.  No Lopez's sign is present.  No rebound   or guarding is present.  Normal bowel sounds are present.  No suprapubic   tenderness is present.  MUSCULOSKELETAL:  No cyanosis, clubbing or edema of the extremities is present   and no musculoskeletal defects are present.  SKIN:  Warm and dry, not mottled, normal color and temperature.  No rashes or   ecchymoses or petechia are present.  NEUROLOGIC:  He has a tremor of the hands, but he is awake and alert.  He is   oriented x3.  Cranial nerves are intact.  He is moving all extremities   equally.    LABORATORY DATA:  White blood cell count is 8.4, hemoglobin is 13.7,   hematocrit is 41.8, and platelets are 166.  Sodium 138, potassium 3.3,   chloride 100, bicarbonate 26, glucose 100, BUN 21, creatinine 0.68, calcium is   8.3, AST is 20, ALT is 17, alkaline phosphatase is 70, total bilirubin is   0.5, and albumin is 3.5.    IMPRESSION AND PLAN:  1.   Hematemesis:  Differential diagnosis includes Adele-Larson tear from   persistent vomiting, peptic ulceration or variceal bleed given his history of   alcoholism.  I think at this point my #1 diagnosis would be a Adele-Larson   tear based upon the history.  Given the circumstances, however, we will put   him on a Protonix drip and octreotide drip and leave him n.p.o.    Gastroenterology has been consulted and will see the patient and arrange for   evaluation with upper endoscopy.  He has, however, been typed and crossed.  We   will get a hemoglobin every 6 hours.  I am running fluids, normal saline   _____ with 20 mEq of potassium at 100 mL per hour.  Follow BMP in the morning   as well.  2.  Alcoholism:  He is pursuing prolonged rehabilitative therapy.  He is   looking into several area facilities.  In the meanwhile, we will continue his   Campral and because he is in really some degree of alcohol withdrawal, we will   start the alcohol withdrawal protocol.  He can be on sliding scale.  Ativan   as needed.  CIWA scores will be measured and he is going to continue on the   Campral as above.  The patient expresses desire to stop drinking.  3.  Hypokalemia:  I am putting potassium in his IV fluids and recheck the   potassium in the morning.  4.  Posttraumatic stress disorder:  Continue his usual antidepressants and   recommend counseling for alcohol cessation.       ____________________________________     MD SADAF BRITO / CHARLES    DD:  01/28/2017 03:44:41  DT:  01/28/2017 04:36:45    D#:  [id number]  Job#:  [id number]

## 2017-01-28 NOTE — DIETARY
"Nutrition Services: consult for inadequate PO intake    PMHx: PTSD, alcoholism     Ht: 70\" Weight: 86.6kg (191#) BMI: 27.8 IBW: 75.5kg (166#) % IBW: 115%    Labs: glucose: 100 Na: 138 K: 3.3 albumin: 3.5  Meds: prilosec, zofran, thiamine, promethazine, folic acid  GI: last BM 1/27   Skin: intact; no documented skin issues  Diet: Full liquids    45y/o M admit with vomiting blood with known past medical hx significant for PTSD and alcoholism per H&P. Consult for inadequate PO intake received. Pt admit 2' ETOH abuse and now presents w/ hematemesis s/p EGD and found to have reflux esophagitis, erosive duodenitis, and nonerosive gastritis per MD. Therefore, previously NPO and now advanced to full liquid diet.     Recommendations:  1) RD to encourage adequate intake of full liquids to observe tolerance and nutrition rep to honor pt food preferences within pt's dietary restrictions to optimize PO intake.   2) As soon as medically feasible further advance diet to GI soft; low fiber diet    RD to monitor PO adequacy, diet advancement/tolerance, wt, and labs to leave further recommendations if warranted.   "

## 2017-01-28 NOTE — PROCEDURES
DATE OF SERVICE:  01/28/2017    TIME:  11:03 a.m.    ENDOSCOPIC PROCEDURE PERFORMED:  Esophagogastroduodenoscopy with biopsies.    PREOPERATIVE DIAGNOSES OR INDICATIONS:  Upper gastrointestinal bleeding,   hematemesis, anemia from acute blood loss, alcohol abuse, and alcoholism.    POSTOPERATIVE DIAGNOSES OR FINDINGS:  1.  Severe reflux esophagitis, likely etiology of prior hematemesis and   gastrointestinal bleeding.  2.  Erosive duodenitis, moderate severity.  3.  Nonerosive gastritis of mild severity, biopsied to evaluate for   Helicobacter pylori status.  4.  No evidence of Adele-Larson tear nor esophageal varices.    ENDOSCOPIST:  Temo Anderson MD, [facility]    Monitored anesthesia care per Dr. Fahad Rosario.    CONSENT:  Risks, benefits, and alternatives were discussed with patient.  He   was given opportunity to ask questions and discussed other options, risk   including but not limited to perforation, infection, bleeding, missed lesions,   possible need for surgery, cardiopulmonary event, aspiration, stroke,   hospitalization, possibly prolonged discomfort, possibly repeat procedures,   additional testing, ineffective therapy or persistent GI bleeding and other   potential life threatening complications.  Discussion was undertaken in   layman's terms.  He stated clear understanding and acceptance of risk and   wished to proceed with procedures as detailed in this note.  Consent was given   by patient in clear state of mind.    PROCEDURES IN DETAIL:  Diagnostic endoscope was inserted from the mouth to the   second portion of the duodenum.  Retroflexion was performed in the stomach.    Gastric mucosal biopsies were obtained and sent to pathology.  There was   suction insufflated air and stomach fluid contents upon removal.    PROCEDURE TIMES:  Patient arrival at [facility] preoperative area at 10:33 a.m.    [facility] OR in-time at 10:42 a.m.  Time-out performed and subsequent procedure   scope insertion time 10:45 a.m.   Scope withdrawal out time at 10:52 a.m.    Patient transported to postop recovery at 11 a.m.    EGD FINDINGS:  1.  Esophagus:  Severe reflux esophagitis with nodular salmon-colored mucosa   suggestive of underlying Varma's esophagus, biopsied.  Distal one-third   portion of the esophagus appeared to have reflux esophagitis and/or   salmon-colored mucosa, approximately more than 3 cm in length.  Biopsies   obtained to evaluate for underlying Varma's esophagus.  2.  Stomach:  Nonerosive gastritis, mostly involving the antrum of mild   severity, biopsy to evaluate for Helicobacter pylori status.  3.  Duodenum:  Erosive duodenitis, moderate severity, no evidence of large   duodenal ulcers nor duodenal ulcer bleed.    PATHOLOGICAL SPECIMENS:  A.  Reflux esophagitis, rule out underlying Varma's.  B.  Post-gastric biopsies to rule out Helicobacter pylori.    IMPRESSION:  1.  Severe reflux esophagitis, likely etiology of prior hemetemesis and   gastrointestinal bleeding.  2.  Possible underlying Varma's esophagus, biopsied.  3.  Nonerosive gastritis, biopsy to evaluate for Helicobacter pylori status.  4.  Erosive duodenitis.    RECOMMENDATIONS:  1.  Avoid NSAIDs.  2.  Alcohol cessation, warned patient of health hazards and counseled him on   cessation.  3.  [facility] inpatient [facility] at [facility]  4.  Advance diet as tolerated.  5.  Follow up biopsies and consider surveillance EGD in 6 months to a year if   Varma's esophagus is confirmed.  6.  Discontinue Protonix drip.  7.  Prilosec 40 mg p.o. b.i.d. x8 weeks.  8.  Carafate 1 g q.i.d. suspension x14 days.  9.  Check a gastric emptying study to evaluate for gastroparesis.       ____________________________________     MD LINA CONTRERAS / NTS    DD:  01/28/2017 11:08:48  DT:  01/28/2017 12:35:08    D#:  [id number]  Job#:  [id number]    cc: EBONI YEN MD, Primary care physician at [facility]

## 2017-01-28 NOTE — CONSULTS
[facility]    DATE OF SERVICE:  01/28/2017    REFERRING PHYSICIAN:  Eboni Hand MD    PRIMARY CARE:  At the [facility] as well as gastroenterologist there.    REASON FOR CONSULTATION:  Hematemesis.    HISTORY OF PRESENT ILLNESS:  A 46-year-old  male with alcoholism and   PTSD, presents with hematemesis x2 days.  The patient routinely drinks a fifth   of vodka daily.  He has been taking ibuprofen for headaches.  He was   transferred from the [facility] due to alcohol withdrawal and   hematemesis.  He was initially tremulous at the [facility], but this   morning is alert and oriented, conversive and without delirium tremors.  His   prior hemetemesis was severe, had numerous episodes for 2 days, coffee ground   to bright red.  He has not had hematemesis today and has been n.p.o. since   last night.  Otherwise, he has had some associated epigastric abdominal   discomfort, burning, mild-to-moderate in severity, nonradiating.  He is unsure   if he has had melena.  He denies further modifying factors, associated   symptoms, or timing issues with his complaints.    PAST MEDICAL HISTORY:  Alcoholism without history of cirrhosis, PTSD, history   of alcohol withdrawal, cigarette smoker, benign prostatic hypertrophy, chronic   back pain, depression.    PAST SURGICAL HISTORY:  Denied.    FAMILY HISTORY:  Positive for psychiatric conditions.  Otherwise, denied   family history of gastric cancer, peptic ulcer disease, gastrointestinal   cancers, colorectal cancer, or colon polyps.    SOCIAL HISTORY:  He smokes 2 packs per day of cigarettes daily for decades.    Drinks a fifth of vodka daily, had binge drinks, on and off relapses.  Denied   illicit drug use.  Admits to tattoos.  .  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    ALLERGIES:  MORPHINE.    OUTPATIENT MEDICATIONS:  Acamprosate, calcium carbonate, escitalopram,   hydroxyzine p.r.n., nicotine patch, prazosin, thiamine, tramadol,  trazodone.    REVIEW OF SYSTEMS:  As per HPI, past medical history, past surgical history   sections, otherwise greater than 10 systems negative including constitutional,   head, ears, eyes, nose, throat, pulmonary, cardiovascular, GI, genitourinary,   hematological, rheumatological, neurological, dermatological, oncological,   psychiatric.    PHYSICAL EXAMINATION:  VITAL SIGNS:  Temperature 98, respirations 16, pulse 95, blood pressure   152/91, weight of 86.6 kilograms, BMI of 27.8.  GENERAL:  A well-developed, well-nourished overweight white male, in no   apparent distress, alert and oriented.  HEENT:  Head atraumatic, normocephalic.  Eyes, extraocular movements intact,   nonicteric sclerae.  Nose, no erythema or discharge.  Mouth, no erythema, no   discharge.  No thrush noted.  NECK:  Supple.  No lymphadenopathy or JVD.  PULMONARY:  Clear to auscultation bilaterally.  CARDIOVASCULAR:  Regular rate and rhythm without murmur, rub or gallop.  ABDOMEN:  Nondistended.  Mildly tender epigastrium, but not severe.  Positive   hepatomegaly noted, but no splenomegaly.  No ecchymosis or rebound.  DERMATOLOGICAL:  No spider angiomas or palmar erythema.  NEUROLOGIC:  No focal deficits appreciated.  No asterixis.  MUSCULOSKELETAL:  Moving all extremities.  Strength 5/5 throughout.  PSYCHIATRIC:  Appropriate mood, affect, interaction, insight.    LABORATORY DATA:  WBC 8.4, hemoglobin 13.7, hematocrit 41.8.  Sodium 138,   potassium 3.3, chloride 100, bicarb 26, BUN 21, creatinine 0.68, GFR greater   than 60, calcium 8.3, AST 20, ALT 17, total bilirubin 0.5, albumin 3.5.  Blood   type A positive.  EKG from 01/28/2017, sinus rhythm with borderline prolonged   QT interval of 392, QTc of 477.    IMPRESSION:  A 46-year-old  male with alcoholism, but without known   liver cirrhosis and without evidence of thrombocytopenia, nor abnormal liver   tests, presents with hematemesis, anemia from acute blood loss consistent with    upper gastrointestinal bleeding.  Differential diagnosis is most likely   related to Adele-Larson tear versus alcoholic gastritis versus reflux   gastritis rather than portal hypertensive bleeding, but this is also possible.    Otherwise, peptic ulcer disease, gastrointestinal angiodysplasias should   also come into consideration.  Comorbidities include continued alcoholism,   hypokalemia, hypocalcemia, borderline prolonged QT interval, overweight with   BMI of 27, posttraumatic stress disorder, and as per above.    PROBLEMS:  1.  Hemetemesis.  2.  Upper gastrointestinal bleeding.  3.  Anemia from acute blood loss.  4.  Alcohol abuse and alcoholism.  5.  Overweight.  6.  Benign prostatic hypertrophy.  7.  Posttraumatic stress disorder.  8.  Comorbidities as per above.    RECOMMENDATIONS:  1.  Continue Protonix and octreotide drips.  2.  Keep n.p.o.  3.  EGD plan with anesthesia support, which is needed due to the patient's   alcohol abuse and anticipated intolerance to moderate sedation.  Risks,   benefits, and alternatives were discussed with patient.  He was given   opportunity to ask questions and discussed other options, risks including but   not limited to perforation, infection, bleeding, missed lesions, possible need   for surgery, cardiopulmonary event, aspiration, stroke, hospitalization,   possibly prolonged discomfort, possibly repeat procedures, additional testing,   ineffective therapy and/or persistent bleeding and other potential life   threatening complications.  Discussion was undertaken in Layman's terms.  He   stated clear understanding and acceptance of risks and wished to proceed with   procedures as detailed in this note.  Consent was given by patient in clear   state of mind.  4.  Alcohol cessation, counseled the patient on health hazards and risks   including death.  5.  Serial hemoglobin and hematocrits, transfuse for goal hemoglobin of   greater than 6.  6.  Avoid NSAIDs and blood  thinners.  7.  Correct hypokalemia.  8.  Recommend that hospitalist check magnesium and phosphate levels and   replete as appropriate if with deficiency, which is common with alcoholism and   abuse.  9.  Start 2 large bore IVs for good vascular access.    Dear Dr. Eboni Yen,    Thank you for asking me to evaluate the patient in consultation.  Please refer   to my consult note as per above for details of recommendations.  Please feel   free to call us with any questions.       ____________________________________     MD LINA CONTRERAS / CHARLES    DD:  01/28/2017 08:39:31  DT:  01/28/2017 10:27:58    D#:  [id number]  Job#:  [id number]    cc: EBONI YEN MD, DORITA MORRISSEY MD

## 2017-01-28 NOTE — PROGRESS NOTES
GI Consult note dictated.  Risks, benefits, and alternatives were discussed with patient for EGD with anesthesia.  Consenting person was given an opportunity to ask questions and discuss other options.  Risks including but not limited to perforation, infection, bleeding, missed lesion(s), cardiac and/or pulmonary event, aspiration, stroke, possible need for surgery and/or interventional radiology, hospitalization possibly prolonged, discomfort, unsuccessful and/or incomplete procedure, ineffective therapy and/or persistent symptoms, possible need for repeat procedures and/or additional testings, damage to adjacent organs and/or vascular structures, medication reaction, disability, death, and other adverse events possibly life-threatening.  Discussion was undertaken with Layman's terms.  Consenting person stated understanding and acceptance of these risks, and wished to proceed.  Consent was given in clear state of mind.

## 2017-01-28 NOTE — OR SURGEON
Immediate Post-Operative Note      PreOp Diagnosis: upper GI bleed    PostOp Diagnosis: severe reflux esophagitis, gastroduodenitis of moderate severity    Procedure(s):  EGD biopsies    Endoscopist(s):  Temo Anderson M.D., [facility]    Anesthesiologist/Type of Anesthesia:  Anesthesiologist: Angelo Rosario M.D./General    Surgical Staff:  Circulator: Maddie Rodriguez R.N.  Endoscopy Technician: Franc Salgado    Specimen: gastric    Estimated Blood Loss: none    Findings: severe reflux possible sarmiento's esophagus, erosive duodenitis, nonerosive gastritis    Complications: none        1/28/2017 11:01 AM Temo Anderson

## 2017-01-28 NOTE — ED NOTES
[facility]  He reports BRB in vomit x1 days. He states he has been vomiting for approx 4 days. He reports he does use ETOH chronically and had been trying to stop at home. He has not vomited during his hospitalization. He has no hx of esophogeal varices. He was started on an octreotide and pantoprazole drip at [facility]. It was discontinued for transport. He does report abdominal pain, 8/10.      Pt educated on ED process and asked to wait in lobby until appropriate bed assignment can be made. Patient educated on importance of alerting staff to new or worsening symptoms or concerns.

## 2017-01-29 ENCOUNTER — APPOINTMENT (OUTPATIENT)
Dept: RADIOLOGY | Facility: MEDICAL CENTER | Age: 47
DRG: 392 | End: 2017-01-29
Attending: INTERNAL MEDICINE
Payer: COMMERCIAL

## 2017-01-29 PROBLEM — E87.29 HIGH ANION GAP METABOLIC ACIDOSIS: Status: ACTIVE | Noted: 2017-01-29

## 2017-01-29 PROBLEM — R73.9 HYPERGLYCEMIA: Status: ACTIVE | Noted: 2017-01-29

## 2017-01-29 PROBLEM — F10.11 HISTORY OF ALCOHOL ABUSE: Status: ACTIVE | Noted: 2017-01-29

## 2017-01-29 PROBLEM — E87.6 HYPOKALEMIA: Status: ACTIVE | Noted: 2017-01-29

## 2017-01-29 PROBLEM — K92.0 HEMATEMESIS WITH NAUSEA: Status: ACTIVE | Noted: 2017-01-29

## 2017-01-29 LAB
ALBUMIN SERPL BCP-MCNC: 2.9 G/DL (ref 3.2–4.9)
ALBUMIN/GLOB SERPL: 1.2 G/DL
ALP SERPL-CCNC: 62 U/L (ref 30–99)
ALT SERPL-CCNC: 11 U/L (ref 2–50)
AMMONIA PLAS-SCNC: 44 UMOL/L (ref 11–45)
ANION GAP SERPL CALC-SCNC: 3 MMOL/L (ref 0–11.9)
AST SERPL-CCNC: 14 U/L (ref 12–45)
BASOPHILS # BLD AUTO: 0.6 % (ref 0–1.8)
BASOPHILS # BLD: 0.03 K/UL (ref 0–0.12)
BILIRUB SERPL-MCNC: 1.1 MG/DL (ref 0.1–1.5)
BUN SERPL-MCNC: 13 MG/DL (ref 8–22)
CALCIUM SERPL-MCNC: 7.8 MG/DL (ref 8.5–10.5)
CHLORIDE SERPL-SCNC: 107 MMOL/L (ref 96–112)
CO2 SERPL-SCNC: 27 MMOL/L (ref 20–33)
CREAT SERPL-MCNC: 0.66 MG/DL (ref 0.5–1.4)
EKG IMPRESSION: NORMAL
EOSINOPHIL # BLD AUTO: 0.31 K/UL (ref 0–0.51)
EOSINOPHIL NFR BLD: 5.8 % (ref 0–6.9)
ERYTHROCYTE [DISTWIDTH] IN BLOOD BY AUTOMATED COUNT: 49.4 FL (ref 35.9–50)
GFR SERPL CREATININE-BSD FRML MDRD: >60 ML/MIN/1.73 M 2
GLOBULIN SER CALC-MCNC: 2.4 G/DL (ref 1.9–3.5)
GLUCOSE SERPL-MCNC: 95 MG/DL (ref 65–99)
HCT VFR BLD AUTO: 37.6 % (ref 42–52)
HGB BLD-MCNC: 11.9 G/DL (ref 14–18)
IMM GRANULOCYTES # BLD AUTO: 0.01 K/UL (ref 0–0.11)
IMM GRANULOCYTES NFR BLD AUTO: 0.2 % (ref 0–0.9)
LYMPHOCYTES # BLD AUTO: 1.34 K/UL (ref 1–4.8)
LYMPHOCYTES NFR BLD: 24.9 % (ref 22–41)
MAGNESIUM SERPL-MCNC: 1.6 MG/DL (ref 1.5–2.5)
MCH RBC QN AUTO: 29.5 PG (ref 27–33)
MCHC RBC AUTO-ENTMCNC: 31.6 G/DL (ref 33.7–35.3)
MCV RBC AUTO: 93.1 FL (ref 81.4–97.8)
MONOCYTES # BLD AUTO: 0.41 K/UL (ref 0–0.85)
MONOCYTES NFR BLD AUTO: 7.6 % (ref 0–13.4)
NEUTROPHILS # BLD AUTO: 3.28 K/UL (ref 1.82–7.42)
NEUTROPHILS NFR BLD: 60.9 % (ref 44–72)
NRBC # BLD AUTO: 0 K/UL
NRBC BLD AUTO-RTO: 0 /100 WBC
PHOSPHATE SERPL-MCNC: 1.9 MG/DL (ref 2.5–4.5)
PLATELET # BLD AUTO: 108 K/UL (ref 164–446)
PMV BLD AUTO: 10.1 FL (ref 9–12.9)
POTASSIUM SERPL-SCNC: 3.8 MMOL/L (ref 3.6–5.5)
PROT SERPL-MCNC: 5.3 G/DL (ref 6–8.2)
RBC # BLD AUTO: 4.04 M/UL (ref 4.7–6.1)
SODIUM SERPL-SCNC: 137 MMOL/L (ref 135–145)
WBC # BLD AUTO: 5.4 K/UL (ref 4.8–10.8)

## 2017-01-29 PROCEDURE — A9541 TC99M SULFUR COLLOID: HCPCS

## 2017-01-29 PROCEDURE — 93010 ELECTROCARDIOGRAM REPORT: CPT | Performed by: INTERNAL MEDICINE

## 2017-01-29 PROCEDURE — 700111 HCHG RX REV CODE 636 W/ 250 OVERRIDE (IP): Performed by: INTERNAL MEDICINE

## 2017-01-29 PROCEDURE — 700111 HCHG RX REV CODE 636 W/ 250 OVERRIDE (IP): Performed by: HOSPITALIST

## 2017-01-29 PROCEDURE — 84100 ASSAY OF PHOSPHORUS: CPT

## 2017-01-29 PROCEDURE — 83735 ASSAY OF MAGNESIUM: CPT

## 2017-01-29 PROCEDURE — 700102 HCHG RX REV CODE 250 W/ 637 OVERRIDE(OP): Performed by: INTERNAL MEDICINE

## 2017-01-29 PROCEDURE — 36415 COLL VENOUS BLD VENIPUNCTURE: CPT

## 2017-01-29 PROCEDURE — A9270 NON-COVERED ITEM OR SERVICE: HCPCS | Performed by: INTERNAL MEDICINE

## 2017-01-29 PROCEDURE — 90732 PPSV23 VACC 2 YRS+ SUBQ/IM: CPT | Performed by: HOSPITALIST

## 2017-01-29 PROCEDURE — 82140 ASSAY OF AMMONIA: CPT

## 2017-01-29 PROCEDURE — 90471 IMMUNIZATION ADMIN: CPT

## 2017-01-29 PROCEDURE — 93005 ELECTROCARDIOGRAM TRACING: CPT | Performed by: HOSPITALIST

## 2017-01-29 PROCEDURE — 85025 COMPLETE CBC W/AUTO DIFF WBC: CPT

## 2017-01-29 PROCEDURE — 80053 COMPREHEN METABOLIC PANEL: CPT

## 2017-01-29 PROCEDURE — 99233 SBSQ HOSP IP/OBS HIGH 50: CPT | Performed by: INTERNAL MEDICINE

## 2017-01-29 PROCEDURE — 3E0234Z INTRODUCTION OF SERUM, TOXOID AND VACCINE INTO MUSCLE, PERCUTANEOUS APPROACH: ICD-10-PCS | Performed by: HOSPITALIST

## 2017-01-29 PROCEDURE — A9270 NON-COVERED ITEM OR SERVICE: HCPCS | Performed by: HOSPITALIST

## 2017-01-29 PROCEDURE — 700102 HCHG RX REV CODE 250 W/ 637 OVERRIDE(OP): Performed by: HOSPITALIST

## 2017-01-29 PROCEDURE — 770020 HCHG ROOM/CARE - TELE (206)

## 2017-01-29 RX ORDER — POTASSIUM CHLORIDE 20 MEQ/1
40 TABLET, EXTENDED RELEASE ORAL ONCE
Status: COMPLETED | OUTPATIENT
Start: 2017-01-29 | End: 2017-01-29

## 2017-01-29 RX ORDER — MAGNESIUM SULFATE HEPTAHYDRATE 40 MG/ML
2 INJECTION, SOLUTION INTRAVENOUS ONCE
Status: COMPLETED | OUTPATIENT
Start: 2017-01-29 | End: 2017-01-29

## 2017-01-29 RX ADMIN — SUCRALFATE 1 G: 1 SUSPENSION ORAL at 22:06

## 2017-01-29 RX ADMIN — ESCITALOPRAM OXALATE 10 MG: 10 TABLET ORAL at 08:51

## 2017-01-29 RX ADMIN — LORAZEPAM 1.5 MG: 2 INJECTION INTRAMUSCULAR; INTRAVENOUS at 02:30

## 2017-01-29 RX ADMIN — TRAMADOL HYDROCHLORIDE 100 MG: 50 TABLET, COATED ORAL at 12:46

## 2017-01-29 RX ADMIN — LORAZEPAM 0.5 MG: 1 TABLET ORAL at 12:56

## 2017-01-29 RX ADMIN — PNEUMOCOCCAL VACCINE POLYVALENT 25 MCG
25; 25; 25; 25; 25; 25; 25; 25; 25; 25; 25; 25; 25; 25; 25; 25; 25; 25; 25; 25; 25; 25; 25 INJECTION, SOLUTION INTRAMUSCULAR; SUBCUTANEOUS at 12:56

## 2017-01-29 RX ADMIN — SUCRALFATE 1 G: 1 SUSPENSION ORAL at 17:09

## 2017-01-29 RX ADMIN — TRAMADOL HYDROCHLORIDE 100 MG: 50 TABLET, COATED ORAL at 22:12

## 2017-01-29 RX ADMIN — NICOTINE 1 PATCH: 21 PATCH TRANSDERMAL at 06:24

## 2017-01-29 RX ADMIN — SUCRALFATE 1 G: 1 SUSPENSION ORAL at 12:47

## 2017-01-29 RX ADMIN — OMEPRAZOLE 40 MG: 20 CAPSULE, DELAYED RELEASE ORAL at 08:51

## 2017-01-29 RX ADMIN — LORAZEPAM 1.5 MG: 2 INJECTION INTRAMUSCULAR; INTRAVENOUS at 05:51

## 2017-01-29 RX ADMIN — LORAZEPAM 1 MG: 1 TABLET ORAL at 07:21

## 2017-01-29 RX ADMIN — MAGNESIUM SULFATE IN WATER 2 G: 40 INJECTION, SOLUTION INTRAVENOUS at 12:52

## 2017-01-29 RX ADMIN — TRAZODONE HYDROCHLORIDE 150 MG: 50 TABLET ORAL at 22:03

## 2017-01-29 RX ADMIN — OMEPRAZOLE 40 MG: 20 CAPSULE, DELAYED RELEASE ORAL at 22:03

## 2017-01-29 RX ADMIN — FOLIC ACID 1 MG: 1 TABLET ORAL at 08:51

## 2017-01-29 RX ADMIN — LORAZEPAM 1.5 MG: 2 INJECTION INTRAMUSCULAR; INTRAVENOUS at 03:41

## 2017-01-29 RX ADMIN — SUCRALFATE 1 G: 1 SUSPENSION ORAL at 06:24

## 2017-01-29 RX ADMIN — NICOTINE POLACRILEX 2 MG: 2 GUM, CHEWING BUCCAL at 22:02

## 2017-01-29 RX ADMIN — THERA TABS 1 TABLET: TAB at 08:51

## 2017-01-29 RX ADMIN — LORAZEPAM 1.5 MG: 2 INJECTION INTRAMUSCULAR; INTRAVENOUS at 04:48

## 2017-01-29 RX ADMIN — HYDROXYZINE HYDROCHLORIDE 25 MG: 25 TABLET ORAL at 23:21

## 2017-01-29 RX ADMIN — Medication 100 MG: at 08:51

## 2017-01-29 RX ADMIN — LORAZEPAM 1.5 MG: 2 INJECTION INTRAMUSCULAR; INTRAVENOUS at 01:05

## 2017-01-29 RX ADMIN — POTASSIUM CHLORIDE 40 MEQ: 1500 TABLET, EXTENDED RELEASE ORAL at 12:46

## 2017-01-29 ASSESSMENT — ENCOUNTER SYMPTOMS
BACK PAIN: 0
EYE DISCHARGE: 0
CARDIOVASCULAR NEGATIVE: 1
SENSORY CHANGE: 0
PALPITATIONS: 0
NAUSEA: 1
ORTHOPNEA: 0
FOCAL WEAKNESS: 0
NECK PAIN: 0
CHILLS: 0
HALLUCINATIONS: 0
SPUTUM PRODUCTION: 0
EYE PAIN: 0
ABDOMINAL PAIN: 0
HEARTBURN: 1
COUGH: 0
MUSCULOSKELETAL NEGATIVE: 1
MYALGIAS: 0
HEADACHES: 0
FEVER: 0
DEPRESSION: 0
DIZZINESS: 0
NERVOUS/ANXIOUS: 0
SHORTNESS OF BREATH: 0
BLURRED VISION: 0
RESPIRATORY NEGATIVE: 1
VOMITING: 1
SPEECH CHANGE: 0

## 2017-01-29 ASSESSMENT — LIFESTYLE VARIABLES
AUDITORY DISTURBANCES: MILD HARSHNESS OR ABILITY TO FRIGHTEN
TOTAL SCORE: VERY MILD ITCHING, PINS AND NEEDLES SENSATION, BURNING OR NUMBNESS
PAROXYSMAL SWEATS: *
PAROXYSMAL SWEATS: NO SWEAT VISIBLE
ORIENTATION AND CLOUDING OF SENSORIUM: ORIENTED AND CAN DO SERIAL ADDITIONS
TREMOR: *
PAROXYSMAL SWEATS: BARELY PERCEPTIBLE SWEATING. PALMS MOIST
NAUSEA AND VOMITING: MILD NAUSEA WITH NO VOMITING
ORIENTATION AND CLOUDING OF SENSORIUM: CANNOT DO SERIAL ADDITIONS OR IS UNCERTAIN ABOUT DATE
TREMOR: *
TOTAL SCORE: 8
AUDITORY DISTURBANCES: NOT PRESENT
NAUSEA AND VOMITING: NO NAUSEA AND NO VOMITING
AGITATION: *
PAROXYSMAL SWEATS: *
TREMOR: *
NAUSEA AND VOMITING: *
AGITATION: *
PAROXYSMAL SWEATS: NO SWEAT VISIBLE
HEADACHE, FULLNESS IN HEAD: MODERATE
TOTAL SCORE: 1
NAUSEA AND VOMITING: MILD NAUSEA WITH NO VOMITING
AGITATION: *
ORIENTATION AND CLOUDING OF SENSORIUM: CANNOT DO SERIAL ADDITIONS OR IS UNCERTAIN ABOUT DATE
TOTAL SCORE: 18
TREMOR: *
HEADACHE, FULLNESS IN HEAD: MODERATE
VISUAL DISTURBANCES: NOT PRESENT
TOTAL SCORE: 1
ANXIETY: NO ANXIETY (AT EASE)
NAUSEA AND VOMITING: *
TOTAL SCORE: VERY MILD ITCHING, PINS AND NEEDLES SENSATION, BURNING OR NUMBNESS
ANXIETY: *
AGITATION: SOMEWHAT MORE THAN NORMAL ACTIVITY
ORIENTATION AND CLOUDING OF SENSORIUM: CANNOT DO SERIAL ADDITIONS OR IS UNCERTAIN ABOUT DATE
TREMOR: NO TREMOR
ORIENTATION AND CLOUDING OF SENSORIUM: CANNOT DO SERIAL ADDITIONS OR IS UNCERTAIN ABOUT DATE
ORIENTATION AND CLOUDING OF SENSORIUM: CANNOT DO SERIAL ADDITIONS OR IS UNCERTAIN ABOUT DATE
TOTAL SCORE: 18
TREMOR: TREMOR NOT VISIBLE BUT CAN BE FELT, FINGERTIP TO FINGERTIP
HEADACHE, FULLNESS IN HEAD: MODERATELY SEVERE
HEADACHE, FULLNESS IN HEAD: MODERATE
NAUSEA AND VOMITING: NO NAUSEA AND NO VOMITING
ANXIETY: NO ANXIETY (AT EASE)
AUDITORY DISTURBANCES: MILD HARSHNESS OR ABILITY TO FRIGHTEN
NAUSEA AND VOMITING: *
TREMOR: *
AUDITORY DISTURBANCES: MILD HARSHNESS OR ABILITY TO FRIGHTEN
AGITATION: NORMAL ACTIVITY
ORIENTATION AND CLOUDING OF SENSORIUM: CANNOT DO SERIAL ADDITIONS OR IS UNCERTAIN ABOUT DATE
TOTAL SCORE: 19
HEADACHE, FULLNESS IN HEAD: NOT PRESENT
ORIENTATION AND CLOUDING OF SENSORIUM: CANNOT DO SERIAL ADDITIONS OR IS UNCERTAIN ABOUT DATE
AUDITORY DISTURBANCES: NOT PRESENT
AGITATION: *
ANXIETY: NO ANXIETY (AT EASE)
TOTAL SCORE: 17
NAUSEA AND VOMITING: NO NAUSEA AND NO VOMITING
TOTAL SCORE: 7
AUDITORY DISTURBANCES: MILD HARSHNESS OR ABILITY TO FRIGHTEN
AGITATION: NORMAL ACTIVITY
VISUAL DISTURBANCES: NOT PRESENT
AUDITORY DISTURBANCES: NOT PRESENT
AUDITORY DISTURBANCES: NOT PRESENT
HEADACHE, FULLNESS IN HEAD: MODERATE
TOTAL SCORE: VERY MILD ITCHING, PINS AND NEEDLES SENSATION, BURNING OR NUMBNESS
PAROXYSMAL SWEATS: *
TREMOR: TREMOR NOT VISIBLE BUT CAN BE FELT, FINGERTIP TO FINGERTIP
VISUAL DISTURBANCES: NOT PRESENT
ANXIETY: *
PAROXYSMAL SWEATS: *
ANXIETY: *
VISUAL DISTURBANCES: NOT PRESENT
HEADACHE, FULLNESS IN HEAD: NOT PRESENT
ANXIETY: *
NAUSEA AND VOMITING: *
TOTAL SCORE: 16
VISUAL DISTURBANCES: NOT PRESENT
AUDITORY DISTURBANCES: MILD HARSHNESS OR ABILITY TO FRIGHTEN
ORIENTATION AND CLOUDING OF SENSORIUM: ORIENTED AND CAN DO SERIAL ADDITIONS
TREMOR: *
VISUAL DISTURBANCES: NOT PRESENT
AGITATION: *
VISUAL DISTURBANCES: NOT PRESENT
PAROXYSMAL SWEATS: *
AGITATION: *
VISUAL DISTURBANCES: NOT PRESENT
HEADACHE, FULLNESS IN HEAD: NOT PRESENT
PAROXYSMAL SWEATS: NO SWEAT VISIBLE
TOTAL SCORE: VERY MILD ITCHING, PINS AND NEEDLES SENSATION, BURNING OR NUMBNESS
HEADACHE, FULLNESS IN HEAD: MODERATE
ANXIETY: *
VISUAL DISTURBANCES: NOT PRESENT
ANXIETY: *
TOTAL SCORE: VERY MILD ITCHING, PINS AND NEEDLES SENSATION, BURNING OR NUMBNESS

## 2017-01-29 ASSESSMENT — PAIN SCALES - GENERAL
PAINLEVEL_OUTOF10: 3
PAINLEVEL_OUTOF10: 6
PAINLEVEL_OUTOF10: 3

## 2017-01-29 NOTE — PROGRESS NOTES
2 RN skin assessment performed by Lola BECKER RN and ALDAIR Oliver. Skin intact, no skin breakdown present on admission.

## 2017-01-29 NOTE — CARE PLAN
Problem: Safety  Goal: Will remain free from injury  Outcome: PROGRESSING AS EXPECTED  Pt remains free from injury. Bed in lowest position, locked, and alarm activated. Nonskid footwear in place. Call light and personal belongings within reach. Hourly rounding.    Problem: Infection  Goal: Will remain free from infection  Outcome: PROGRESSING AS EXPECTED  Remains free from signs and symptoms of infection. Standard precautions implemented. Hand hygiene before and after contact with pt. Educated about importance of hand hygiene.

## 2017-01-29 NOTE — PROGRESS NOTES
Assumed pt care. Pt sitting up in bed. Plan of care discussed with pt. Verbalizes understanding. Bed in lowest position, locked, and alarm activated. Call light within reach. SR on monitor.

## 2017-01-29 NOTE — PROGRESS NOTES
Hospital Medicine Progress Note, Adult, Complex               Author: Justice Sykes Date & Time created: 1/29/2017  8:53 AM     CC: vomiting of blood    Interval History:  47 y/o M with pMH of alcoholic, depression, anxiety disorder: admitted for above.    Feeling better, no more vomiting of blood. Mild epigastric pain    Review of Systems:  Review of Systems   Constitutional: Negative for fever, chills and malaise/fatigue.   HENT: Negative for congestion, nosebleeds and tinnitus.    Eyes: Negative for blurred vision, pain and discharge.   Respiratory: Negative for cough, sputum production and shortness of breath.    Cardiovascular: Negative for chest pain, palpitations and orthopnea.   Gastrointestinal: Positive for heartburn, nausea and vomiting. Negative for abdominal pain.   Genitourinary: Negative for dysuria, urgency and frequency.   Musculoskeletal: Negative for myalgias, back pain and neck pain.   Skin: Negative for itching and rash.   Neurological: Negative for dizziness, sensory change, speech change, focal weakness and headaches.   Psychiatric/Behavioral: Negative for depression and hallucinations. The patient is not nervous/anxious.        Physical Exam:  Physical Exam   Constitutional: He is oriented to person, place, and time. No distress.   HENT:   Head: Normocephalic and atraumatic.   Mouth/Throat: Oropharynx is clear and moist.   Eyes: Conjunctivae and EOM are normal. Pupils are equal, round, and reactive to light.   Neck: Normal range of motion. Neck supple. No thyromegaly present.   Cardiovascular: Normal rate and regular rhythm.    No murmur heard.  Pulmonary/Chest: Effort normal and breath sounds normal. No respiratory distress.   Abdominal: Soft. Bowel sounds are normal. He exhibits no distension. There is tenderness.   Musculoskeletal: He exhibits no edema or tenderness.   Neurological: He is alert and oriented to person, place, and time. No cranial nerve deficit.   Skin: Skin is warm and dry. He  is not diaphoretic. No erythema.   Psychiatric: He has a normal mood and affect. His behavior is normal. Thought content normal.       Labs:        Invalid input(s): [id number]      Recent Labs      174  17   0225   SODIUM  138   --    POTASSIUM  3.3*   --    CHLORIDE  100   --    CO2  26   --    BUN  21   --    CREATININE  0.68   --    MAGNESIUM   --   1.6   PHOSPHORUS   --   1.9*   CALCIUM  8.3*   --      Recent Labs      17   002   ALTSGPT  17   ASTSGOT  20   ALKPHOSPHAT  70   TBILIRUBIN  0.5   GLUCOSE  100*     Recent Labs      17   RBC  4.63*   HEMOGLOBIN  13.7*   HEMATOCRIT  41.8*   PLATELETCT  166     Recent Labs      17   WBC  8.4   NEUTSPOLYS  48.00   LYMPHOCYTES  40.00   MONOCYTES  7.90   EOSINOPHILS  3.00   BASOPHILS  0.70   ASTSGOT  20   ALTSGPT  17   ALKPHOSPHAT  70   TBILIRUBIN  0.5           Hemodynamics:  Temp (24hrs), Av.7 °C (98.1 °F), Min:36.3 °C (97.3 °F), Max:37.2 °C (98.9 °F)  Temperature: 36.5 °C (97.7 °F)  Pulse  Av.2  Min: 73  Max: 101Heart Rate (Monitored): 77  Blood Pressure: (!) 95/65 mmHg, NIBP: 100/60 mmHg     Respiratory:    Respiration: 18, Pulse Oximetry: 96 %           Fluids:    Intake/Output Summary (Last 24 hours) at 17 0853  Last data filed at 17 0400   Gross per 24 hour   Intake   2740 ml   Output      0 ml   Net   2740 ml        GI/Nutrition:  Orders Placed This Encounter   Procedures   • DIET ORDER     Standing Status: Standing      Number of Occurrences: 1      Standing Expiration Date:      Order Specific Question:  Diet:     Answer:  Full Liquid [11]     Medical Decision Making, by Problem:  Active Hospital Problems    Diagnosis   • Hematemesis with nausea [K92.0, R11.0]  - EGD   1.  Severe reflux esophagitis, likely etiology of prior hematemesis and    gastrointestinal bleeding.  2.  Erosive duodenitis, moderate severity.  3.  Nonerosive gastritis of mild severity, biopsied to evaluate for     Helicobacter pylori status.  4.  No evidence of Adele-Larson tear nor esophageal varices.  - continue PPI, sucralfate  - follow cbc closely  - follow up on biopsy  - no need for transfusion  - target HGB: >7     • Hypokalemia [E87.6]  - replete as needed, follow cmp in am, worsening     • High anion gap metabolic acidosis [E87.2]  - on IVF     • History of alcohol abuse [Z87.898]     • Hyperglycemia [R73.9]     • GI bleed [K92.2]  - plan as above.       Labs reviewed, Radiology images reviewed and Medications reviewed        DVT Prophylaxis: Contraindicated - High bleeding risk  DVT prophylaxis - mechanical: SCDs

## 2017-01-29 NOTE — PROGRESS NOTES
Assumed care of patient at 0715, received bedside report from night shift RN. Bed is locked and in lowest position with call light within reach. Treaded socks in place. Patient updated on plan of care. Pt cx/o 6/10 lower back pain. Pt scheduled for nuc med gastric emptying study- no pain meds 6 hours prior to test. Pt educated. Will medicate per MAR when patient returns from test. White board updated. Assessment completed. Pt A&Ox3- disoriented to place. CIWA assessments in place. Pt woken for assessments. Tele monitor in place and cardiac rhythm being monitored. All needs met at this time.

## 2017-01-29 NOTE — PROGRESS NOTES
Gastroenterology ([facility]) Progress Note     Author: Temo Anderson   Date & Time Created: 1/29/2017 10:07 AM    Interval History:  CC or problems: reflux esophagitis, alcohol abuse    Patient had melena last night but none today.  Still with nausea of mild to moderate severity, improved but no longer vomiting.  Denied abdominal pain or hematemesis.  Denied further modifying factors, associated symptoms or timing issues.    Review of Systems:  Review of Systems   Constitutional: Positive for malaise/fatigue.   Respiratory: Negative.    Cardiovascular: Negative.    Gastrointestinal: Positive for nausea and melena. Negative for abdominal pain.   Musculoskeletal: Negative.    All other systems reviewed and are negative.      Physical Exam:  Physical Exam   Constitutional: He is oriented to person, place, and time. He appears well-developed and well-nourished. No distress.   HENT:   Head: Normocephalic and atraumatic.   Mouth/Throat: No oropharyngeal exudate.   Eyes: EOM are normal. Pupils are equal, round, and reactive to light. No scleral icterus.   Neck: Normal range of motion. Neck supple. No JVD present. No tracheal deviation present.   Cardiovascular: Normal rate, regular rhythm, normal heart sounds and intact distal pulses.    Pulmonary/Chest: Effort normal and breath sounds normal. No stridor. No respiratory distress.   Abdominal: Soft. Bowel sounds are normal. He exhibits no distension. There is no tenderness. There is no rebound and no guarding.   Musculoskeletal: Normal range of motion. He exhibits no edema or tenderness.   Neurological: He is alert and oriented to person, place, and time. No cranial nerve deficit. Coordination normal.   Skin: Skin is warm and dry. No rash noted. He is not diaphoretic. No erythema. No pallor.   Psychiatric: He has a normal mood and affect. His behavior is normal.   Nursing note and vitals reviewed.      Labs:        Invalid input(s): [id number]      Recent Labs       17   0024  17   0225  17   SODIUM  138   --   137   POTASSIUM  3.3*   --   3.8   CHLORIDE  100   --   107   CO2  26   --   27   BUN  21   --   13   CREATININE  0.68   --   0.66   MAGNESIUM   --   1.6   --    PHOSPHORUS   --   1.9*   --    CALCIUM  8.3*   --   7.8*     Recent Labs      17   0919   ALTSGPT  17  11   ASTSGOT  20  14   ALKPHOSPHAT  70  62   TBILIRUBIN  0.5  1.1   GLUCOSE  100*  95     Recent Labs      17   00217   RBC  4.63*  4.04*   HEMOGLOBIN  13.7*  11.9*   HEMATOCRIT  41.8*  37.6*   PLATELETCT  166  108*     Recent Labs      17   WBC  8.4  5.4   NEUTSPOLYS  48.00  60.90   LYMPHOCYTES  40.00  24.90   MONOCYTES  7.90  7.60   EOSINOPHILS  3.00  5.80   BASOPHILS  0.70  0.60   ASTSGOT  20  14   ALTSGPT  17  11   ALKPHOSPHAT  70  62   TBILIRUBIN  0.5  1.1     Hemodynamics:  Temp (24hrs), Av.7 °C (98.1 °F), Min:36.3 °C (97.3 °F), Max:37.2 °C (98.9 °F)  Temperature: 36.5 °C (97.7 °F)  Pulse  Av.2  Min: 73  Max: 101Heart Rate (Monitored): 77  Blood Pressure: (!) 95/65 mmHg, NIBP: 100/60 mmHg     Respiratory:    Respiration: 18, Pulse Oximetry: 96 %           Fluids:    Intake/Output Summary (Last 24 hours) at 17 1007  Last data filed at 17 0400   Gross per 24 hour   Intake   2740 ml   Output      0 ml   Net   2740 ml        GI/Nutrition:  Orders Placed This Encounter   Procedures   • DIET ORDER     Standing Status: Standing      Number of Occurrences: 1      Standing Expiration Date:      Order Specific Question:  Diet:     Answer:  Full Liquid [11]     Medical Decision Making, by Problem:  Active Hospital Problems    Diagnosis   • Hematemesis with nausea [K92.0, R11.0]   • Hypokalemia [E87.6]   • High anion gap metabolic acidosis [E87.2]   • History of alcohol abuse [Z87.898]   • Hyperglycemia [R73.9]   • GI bleed [K92.2]       Problems:  1.  Severe erosive reflux esophagitis, likely  etiology of prior hematemesis and (2.) upper gastrointestinal bleeding; No evidence of Adele-Larson tear nor esophageal varices.  3.  Alcohol abuse and alcoholism.  4.  Anemia from acute blood loss.  5.  Nausea  6.  Erosive duodenitis, moderate severity.  7.  Nonerosive gastritis of mild severity, biopsied to evaluate for Helicobacter pylori status.  8.  Overweight, BMI of 27  9.  Benign prostatic hypertrophy.  10.  Posttraumatic stress disorder  11. EKG from 01/28/2017, sinus rhythm with borderline prolonged QT interval of 392, QTc of 477.  12. history of alcohol withdrawal  13. cigarette smoker  14. Chronic back pain  15. Depression.  16. Tattooing    Recommendations:  1.  Avoid NSAIDs.  2.  Alcohol cessation, warned patient of health hazards and counseled him on cessation.  3.  Advance diet to cardiac diet  4.  Follow up biopsies and consider surveillance EGD in 6 months to a year if  Varma's esophagus is confirmed.  5.  Prilosec 40 mg p.o. b.i.d. x8 weeks.  6.  Carafate 1 g q.i.d. suspension x14 days.  7.  Gastric emptying study pending to evaluate for gastroparesis.  8.  Antiemetics PRN  9.  Please note that Dr. Maurice Betancur is taking over [facility] main inpatient service tomorrow/Monday 1/30.    Labs reviewed and Medications reviewed

## 2017-01-29 NOTE — PROGRESS NOTES
Pt keeps getting out of bed without calling for assistance. Setting of bed alarm and strip alarm. Pt has demonstrated multiple times that he can turn off the bed and strip alarms by himself. He also disconnected his IV tubing from his IV and ambulated into the hallway to the kitchen before this RN discovered him. Education provided regarding necessity for him to call for assistance rather than getting out of bed by himself. Education provided about ativan, it's side effects, and his increased risk for falls/injury. Verbalized understanding but continues to turn off bed and strip alarms and getting out of bed by himself. Verbalized that he would not disconnect his IV anymore. Pt just pulled out his IV. Education reinforced by Charge RN, Bonilla.    Bed in lowest position, locked, and strip and bed alarms activated. Non skid footwear in place. Call light and personal belongings within reach. Sign on door with risk to fall and 1 person assist. Hourly rounding. CNELKE Ro aware that pt is fall risk and that he is turning off his alarms and ambulating without assistance.

## 2017-01-29 NOTE — CARE PLAN
Problem: Safety  Goal: Will remain free from injury  Outcome: PROGRESSING AS EXPECTED  Bed is locked and in lowest position with treaded socks on and call light within reach. Patient educated regarding safety. Verbalizes understanding and calls appropriately for assistance.     Problem: Knowledge Deficit  Goal: Knowledge of disease process/condition, treatment plan, diagnostic tests, and medications will improve  Outcome: PROGRESSING AS EXPECTED  Pt educated on POC, current medications and doses and disease process. All questions answered.

## 2017-01-29 NOTE — CARE PLAN
Problem: Safety  Goal: Will remain free from falls  Outcome: PROGRESSING AS EXPECTED  Fall precautions remain in place. Bed alarms on. Pt calls for assistance.     Problem: Knowledge Deficit  Goal: Knowledge of disease process/condition, treatment plan, diagnostic tests, and medications will improve  Outcome: PROGRESSING AS EXPECTED  POC discussed. All questions answered. Verbalized understanding.     Problem: Psychosocial Needs:  Goal: Level of anxiety will decrease  Outcome: PROGRESSING SLOWER THAN EXPECTED  CIWA protocol in place. Ativan per MAR.

## 2017-01-30 VITALS
HEIGHT: 69 IN | DIASTOLIC BLOOD PRESSURE: 90 MMHG | TEMPERATURE: 97.3 F | BODY MASS INDEX: 29.78 KG/M2 | WEIGHT: 201.06 LBS | OXYGEN SATURATION: 96 % | HEART RATE: 60 BPM | SYSTOLIC BLOOD PRESSURE: 134 MMHG | RESPIRATION RATE: 19 BRPM

## 2017-01-30 LAB
ALBUMIN SERPL BCP-MCNC: 3 G/DL (ref 3.2–4.9)
ALBUMIN/GLOB SERPL: 1.2 G/DL
ALP SERPL-CCNC: 65 U/L (ref 30–99)
ALT SERPL-CCNC: 14 U/L (ref 2–50)
AMMONIA PLAS-SCNC: 35 UMOL/L (ref 11–45)
ANION GAP SERPL CALC-SCNC: 4 MMOL/L (ref 0–11.9)
AST SERPL-CCNC: 17 U/L (ref 12–45)
BASOPHILS # BLD AUTO: 0.4 % (ref 0–1.8)
BASOPHILS # BLD: 0.03 K/UL (ref 0–0.12)
BILIRUB SERPL-MCNC: 0.7 MG/DL (ref 0.1–1.5)
BUN SERPL-MCNC: 6 MG/DL (ref 8–22)
CALCIUM SERPL-MCNC: 7.8 MG/DL (ref 8.5–10.5)
CHLORIDE SERPL-SCNC: 105 MMOL/L (ref 96–112)
CO2 SERPL-SCNC: 25 MMOL/L (ref 20–33)
CREAT SERPL-MCNC: 0.66 MG/DL (ref 0.5–1.4)
EKG IMPRESSION: NORMAL
EOSINOPHIL # BLD AUTO: 0.26 K/UL (ref 0–0.51)
EOSINOPHIL NFR BLD: 3.9 % (ref 0–6.9)
ERYTHROCYTE [DISTWIDTH] IN BLOOD BY AUTOMATED COUNT: 48.1 FL (ref 35.9–50)
GFR SERPL CREATININE-BSD FRML MDRD: >60 ML/MIN/1.73 M 2
GLOBULIN SER CALC-MCNC: 2.5 G/DL (ref 1.9–3.5)
GLUCOSE SERPL-MCNC: 96 MG/DL (ref 65–99)
HCT VFR BLD AUTO: 38 % (ref 42–52)
HGB BLD-MCNC: 12 G/DL (ref 14–18)
IMM GRANULOCYTES # BLD AUTO: 0.03 K/UL (ref 0–0.11)
IMM GRANULOCYTES NFR BLD AUTO: 0.4 % (ref 0–0.9)
LYMPHOCYTES # BLD AUTO: 2.13 K/UL (ref 1–4.8)
LYMPHOCYTES NFR BLD: 31.9 % (ref 22–41)
MAGNESIUM SERPL-MCNC: 2 MG/DL (ref 1.5–2.5)
MCH RBC QN AUTO: 29.2 PG (ref 27–33)
MCHC RBC AUTO-ENTMCNC: 31.6 G/DL (ref 33.7–35.3)
MCV RBC AUTO: 92.5 FL (ref 81.4–97.8)
MONOCYTES # BLD AUTO: 0.47 K/UL (ref 0–0.85)
MONOCYTES NFR BLD AUTO: 7 % (ref 0–13.4)
NEUTROPHILS # BLD AUTO: 3.76 K/UL (ref 1.82–7.42)
NEUTROPHILS NFR BLD: 56.4 % (ref 44–72)
NRBC # BLD AUTO: 0.02 K/UL
NRBC BLD AUTO-RTO: 0.3 /100 WBC
PHOSPHATE SERPL-MCNC: 1.7 MG/DL (ref 2.5–4.5)
PLATELET # BLD AUTO: 129 K/UL (ref 164–446)
PMV BLD AUTO: 11 FL (ref 9–12.9)
POTASSIUM SERPL-SCNC: 3.8 MMOL/L (ref 3.6–5.5)
PROT SERPL-MCNC: 5.5 G/DL (ref 6–8.2)
RBC # BLD AUTO: 4.11 M/UL (ref 4.7–6.1)
SODIUM SERPL-SCNC: 134 MMOL/L (ref 135–145)
WBC # BLD AUTO: 6.7 K/UL (ref 4.8–10.8)

## 2017-01-30 PROCEDURE — 700101 HCHG RX REV CODE 250: Performed by: HOSPITALIST

## 2017-01-30 PROCEDURE — 700102 HCHG RX REV CODE 250 W/ 637 OVERRIDE(OP): Performed by: HOSPITALIST

## 2017-01-30 PROCEDURE — A9270 NON-COVERED ITEM OR SERVICE: HCPCS | Performed by: INTERNAL MEDICINE

## 2017-01-30 PROCEDURE — 83735 ASSAY OF MAGNESIUM: CPT

## 2017-01-30 PROCEDURE — 93005 ELECTROCARDIOGRAM TRACING: CPT | Performed by: HOSPITALIST

## 2017-01-30 PROCEDURE — 700102 HCHG RX REV CODE 250 W/ 637 OVERRIDE(OP): Performed by: INTERNAL MEDICINE

## 2017-01-30 PROCEDURE — 82140 ASSAY OF AMMONIA: CPT

## 2017-01-30 PROCEDURE — 80053 COMPREHEN METABOLIC PANEL: CPT

## 2017-01-30 PROCEDURE — 93010 ELECTROCARDIOGRAM REPORT: CPT | Performed by: INTERNAL MEDICINE

## 2017-01-30 PROCEDURE — 84100 ASSAY OF PHOSPHORUS: CPT

## 2017-01-30 PROCEDURE — 36415 COLL VENOUS BLD VENIPUNCTURE: CPT

## 2017-01-30 PROCEDURE — 99239 HOSP IP/OBS DSCHRG MGMT >30: CPT | Performed by: INTERNAL MEDICINE

## 2017-01-30 PROCEDURE — 85025 COMPLETE CBC W/AUTO DIFF WBC: CPT

## 2017-01-30 PROCEDURE — A9270 NON-COVERED ITEM OR SERVICE: HCPCS | Performed by: HOSPITALIST

## 2017-01-30 RX ORDER — SUCRALFATE ORAL 1 G/10ML
1 SUSPENSION ORAL
Qty: 560 ML | Refills: 0 | Status: SHIPPED | OUTPATIENT
Start: 2017-01-30 | End: 2017-02-13

## 2017-01-30 RX ORDER — OMEPRAZOLE 40 MG/1
40 CAPSULE, DELAYED RELEASE ORAL 2 TIMES DAILY
Qty: 30 CAP | Refills: 0 | Status: SHIPPED | OUTPATIENT
Start: 2017-01-30

## 2017-01-30 RX ADMIN — LORAZEPAM 0.5 MG: 1 TABLET ORAL at 08:01

## 2017-01-30 RX ADMIN — OMEPRAZOLE 40 MG: 20 CAPSULE, DELAYED RELEASE ORAL at 08:00

## 2017-01-30 RX ADMIN — FOLIC ACID 1 MG: 1 TABLET ORAL at 08:00

## 2017-01-30 RX ADMIN — Medication 100 MG: at 08:01

## 2017-01-30 RX ADMIN — ESCITALOPRAM OXALATE 10 MG: 10 TABLET ORAL at 08:01

## 2017-01-30 RX ADMIN — SUCRALFATE 1 G: 1 SUSPENSION ORAL at 05:47

## 2017-01-30 RX ADMIN — THERA TABS 1 TABLET: TAB at 08:00

## 2017-01-30 ASSESSMENT — LIFESTYLE VARIABLES
ANXIETY: NO ANXIETY (AT EASE)
VISUAL DISTURBANCES: NOT PRESENT
PAROXYSMAL SWEATS: NO SWEAT VISIBLE
ORIENTATION AND CLOUDING OF SENSORIUM: ORIENTED AND CAN DO SERIAL ADDITIONS
AUDITORY DISTURBANCES: NOT PRESENT
PAROXYSMAL SWEATS: NO SWEAT VISIBLE
ORIENTATION AND CLOUDING OF SENSORIUM: ORIENTED AND CAN DO SERIAL ADDITIONS
VISUAL DISTURBANCES: NOT PRESENT
TREMOR: NO TREMOR
ORIENTATION AND CLOUDING OF SENSORIUM: ORIENTED AND CAN DO SERIAL ADDITIONS
TREMOR: NO TREMOR
HEADACHE, FULLNESS IN HEAD: NOT PRESENT
AGITATION: NORMAL ACTIVITY
AGITATION: NORMAL ACTIVITY
TOTAL SCORE: 3
NAUSEA AND VOMITING: NO NAUSEA AND NO VOMITING
HEADACHE, FULLNESS IN HEAD: MILD
PAROXYSMAL SWEATS: BARELY PERCEPTIBLE SWEATING. PALMS MOIST
NAUSEA AND VOMITING: NO NAUSEA AND NO VOMITING
ANXIETY: NO ANXIETY (AT EASE)
AGITATION: NORMAL ACTIVITY
ANXIETY: MILDLY ANXIOUS
PAROXYSMAL SWEATS: NO SWEAT VISIBLE
AUDITORY DISTURBANCES: NOT PRESENT
VISUAL DISTURBANCES: NOT PRESENT
ANXIETY: NO ANXIETY (AT EASE)
HEADACHE, FULLNESS IN HEAD: NOT PRESENT
NAUSEA AND VOMITING: MILD NAUSEA WITH NO VOMITING
AGITATION: NORMAL ACTIVITY
NAUSEA AND VOMITING: NO NAUSEA AND NO VOMITING
AUDITORY DISTURBANCES: NOT PRESENT
ORIENTATION AND CLOUDING OF SENSORIUM: ORIENTED AND CAN DO SERIAL ADDITIONS
VISUAL DISTURBANCES: NOT PRESENT
TOTAL SCORE: 3
TOTAL SCORE: 0
AUDITORY DISTURBANCES: NOT PRESENT
HEADACHE, FULLNESS IN HEAD: NOT PRESENT
TREMOR: *
TOTAL SCORE: 5
TREMOR: *

## 2017-01-30 ASSESSMENT — ENCOUNTER SYMPTOMS
CARDIOVASCULAR NEGATIVE: 1
EYES NEGATIVE: 1
MUSCULOSKELETAL NEGATIVE: 1
CONSTITUTIONAL NEGATIVE: 1
PSYCHIATRIC NEGATIVE: 1
GASTROINTESTINAL NEGATIVE: 1
NEUROLOGICAL NEGATIVE: 1
RESPIRATORY NEGATIVE: 1

## 2017-01-30 NOTE — PROGRESS NOTES
Pt care assumed. Pt lying comfortably in bed. A&O x 4. No distress present. Call light, phone, and bedside table within reach. White board updated and plan of care discussed with patient. Bed alarm and strip alarm set and in place. No concerns present at this time. Will continue to monitor.

## 2017-01-30 NOTE — PROGRESS NOTES
Patient was told he was going to be discharging. Patient's room was empty when I went returned to go over discharge papers with the patient. Patient left unit, searched outside in the smoking area but didn't find patient. PIV wasn't removed from patient before leaving. Left message on patient's voicemail and filled a report with Jorge BLAKELY (for welfare check).

## 2017-01-30 NOTE — CARE PLAN
Problem: Safety  Goal: Will remain free from injury  Outcome: PROGRESSING AS EXPECTED  Pt remains free from falls or injuries. Bed alarm and strip alarm set and in place. Pt calls for assistance appropriately.     Problem: Venous Thromboembolism (VTW)/Deep Vein Thrombosis (DVT) Prevention:  Goal: Patient will participate in Venous Thrombosis (VTE)/Deep Vein Thrombosis (DVT)Prevention Measures  Outcome: PROGRESSING AS EXPECTED  Pt free from s/sx of DVT.

## 2017-01-30 NOTE — DISCHARGE INSTRUCTIONS
Discharge Instructions    Discharged to home by car with self. Discharged via ambulance, hospital escort: Refused.  Special equipment needed: Not Applicable    Be sure to schedule a follow-up appointment with your primary care doctor or any specialists as instructed.     Discharge Plan:   Diet Plan: Discussed  Activity Level: Discussed  Smoking Cessation Offered: Patient Refused  Confirmed Follow up Appointment: Patient to Call and Schedule Appointment  Pneumococcal Vaccine Given - only chart on this line when given: Given (See MAR)  Influenza Vaccine Indication: Patient Refuses    I understand that a diet low in cholesterol, fat, and sodium is recommended for good health. Unless I have been given specific instructions below for another diet, I accept this instruction as my diet prescription.   Other diet: GI soft as tolerated    Special Instructions: None    · Is patient discharged on Warfarin / Coumadin?   No     · Is patient Post Blood Transfusion?  No    Depression / Suicide Risk    As you are discharged from this [facility] facility, it is important to learn how to keep safe from harming yourself.    Recognize the warning signs:  · Abrupt changes in personality, positive or negative- including increase in energy   · Giving away possessions  · Change in eating patterns- significant weight changes-  positive or negative  · Change in sleeping patterns- unable to sleep or sleeping all the time   · Unwillingness or inability to communicate  · Depression  · Unusual sadness, discouragement and loneliness  · Talk of wanting to die  · Neglect of personal appearance   · Rebelliousness- reckless behavior  · Withdrawal from people/activities they love  · Confusion- inability to concentrate     If you or a loved one observes any of these behaviors or has concerns about self-harm, here's what you can do:  · Talk about it- your feelings and reasons for harming yourself  · Remove any means that you might use to hurt  yourself (examples: pills, rope, extension cords, firearm)  · Get professional help from the community (Mental Health, Substance Abuse, psychological counseling)  · Do not be alone:Call your Safe Contact- someone whom you trust who will be there for you.  · Call your local CRISIS HOTLINE 662-3832 or 820-374-1847  · Call your local Children's Mobile Crisis Response Team [place] (221) 215-7505 or www.[employer]  · Call the toll free National Suicide Prevention Hotlines   · National Suicide Prevention Lifeline 774-325-TOXY (1783)  · [employer] Hope Line Network 800-SUICIDE (908-1750)    Gastrointestinal Bleeding  Gastrointestinal bleeding is bleeding somewhere along the path that food travels through the body (digestive tract). This path is anywhere between the mouth and the opening of the butt (anus). You may have blood in your throw up (vomit) or in your poop (stools). If there is a lot of bleeding, you may need to stay in the hospital.  HOME CARE  · Only take medicine as told by your doctor.  · Eat foods with fiber such as whole grains, fruits, and vegetables. You can also try eating 1 to 3 prunes a day.  · Drink enough fluids to keep your pee (urine) clear or pale yellow.  GET HELP RIGHT AWAY IF:   · Your bleeding gets worse.  · You feel dizzy, weak, or you pass out (faint).  · You have bad cramps in your back or belly (abdomen).  · You have large blood clumps (clots) in your poop.  · Your problems are getting worse.  MAKE SURE YOU:   · Understand these instructions.  · Will watch your condition.  · Will get help right away if you are not doing well or get worse.     This information is not intended to replace advice given to you by your health care provider. Make sure you discuss any questions you have with your health care provider.     Document Released: 09/26/2009 Document Revised: 12/04/2013 Document Reviewed: 11/26/2012  [employer] Interactive Patient Education ©2016 [employer]

## 2017-01-30 NOTE — PROGRESS NOTES
Gastroenterology Progress Note     Author: Maurice Betancur   Date & Time Created: 1/30/2017 12:01 PM    Interval History:  CC or problems: reflux esophagitis, alcohol abuse    1/30/2017: no issues overnight. No abdominal pain. Doing well.Tolerated diet.    Review of Systems:  Review of Systems   Constitutional: Negative.    HENT: Negative.    Eyes: Negative.    Respiratory: Negative.    Cardiovascular: Negative.    Gastrointestinal: Negative.    Genitourinary: Negative.    Musculoskeletal: Negative.    Skin: Negative.    Neurological: Negative.    Endo/Heme/Allergies: Negative.    Psychiatric/Behavioral: Negative.        Physical Exam:  Physical Exam   Constitutional: He is oriented to person, place, and time. He appears well-developed and well-nourished.   HENT:   Head: Normocephalic and atraumatic.   Eyes: Conjunctivae and EOM are normal. Pupils are equal, round, and reactive to light.   Neck: Normal range of motion. Neck supple. No JVD present. No tracheal deviation present. No thyromegaly present.   Cardiovascular: Normal rate and regular rhythm.  Exam reveals no gallop and no friction rub.    No murmur heard.  Pulmonary/Chest: No stridor. No respiratory distress. He has no wheezes. He has no rales. He exhibits no tenderness.   Abdominal: He exhibits no distension and no mass. There is no tenderness. There is no rebound and no guarding.   Musculoskeletal: He exhibits no edema or tenderness.   Lymphadenopathy:     He has no cervical adenopathy.   Neurological: He is alert and oriented to person, place, and time. No cranial nerve deficit. Coordination normal.   Skin: Skin is warm and dry. No rash noted. No erythema. No pallor.       Labs:        Invalid input(s): [id number]      Recent Labs      01/28/17   0024  01/29/17   0225  01/29/17   0919  01/30/17   0349   SODIUM  138   --   137  134*   POTASSIUM  3.3*   --   3.8  3.8   CHLORIDE  100   --   107  105   CO2  26   --   27  25   BUN  21   --   13  6*    CREATININE  0.68   --   0.66  0.66   MAGNESIUM   --   1.6   --   2.0   PHOSPHORUS   --   1.9*   --   1.7*   CALCIUM  8.3*   --   7.8*  7.8*     Recent Labs      174  17   0349   ALTSGPT  17  11  14   ASTSGOT  20  14  17   ALKPHOSPHAT  70  62  65   TBILIRUBIN  0.5  1.1  0.7   GLUCOSE  100*  95  96     Recent Labs      17   034   RBC  4.63*  4.04*  4.11*   HEMOGLOBIN  13.7*  11.9*  12.0*   HEMATOCRIT  41.8*  37.6*  38.0*   PLATELETCT  166  108*  129*     Recent Labs      17   WBC  8.4  5.4  6.7   NEUTSPOLYS  48.00  60.90  56.40   LYMPHOCYTES  40.00  24.90  31.90   MONOCYTES  7.90  7.60  7.00   EOSINOPHILS  3.00  5.80  3.90   BASOPHILS  0.70  0.60  0.40   ASTSGOT  20  14  17   ALTSGPT  17  11  14   ALKPHOSPHAT  70  62  65   TBILIRUBIN  0.5  1.1  0.7     Hemodynamics:  Temp (24hrs), Av.8 °C (98.2 °F), Min:36.5 °C (97.7 °F), Max:37.1 °C (98.8 °F)  Temperature: 36.6 °C (97.8 °F)  Pulse  Av.4  Min: 66  Max: 105   Blood Pressure: 120/80 mmHg     Respiratory:    Respiration: 19, Pulse Oximetry: 95 %           Fluids:    Intake/Output Summary (Last 24 hours) at 17 1201  Last data filed at 17 1000   Gross per 24 hour   Intake    960 ml   Output   1000 ml   Net    -40 ml     Weight: 91.2 kg (201 lb 1 oz)  GI/Nutrition:  Orders Placed This Encounter   Procedures   • DIET ORDER     Standing Status: Standing      Number of Occurrences: 1      Standing Expiration Date:      Order Specific Question:  Diet:     Answer:  Full Liquid [11]     Medical Decision Making, by Problem:  Active Hospital Problems    Diagnosis   • Hematemesis with nausea [K92.0, R11.0]   • Hypokalemia [E87.6]   • High anion gap metabolic acidosis [E87.2]   • History of alcohol abuse [Z87.898]   • Hyperglycemia [R73.9]   • GI bleed [K92.2]       GES: 2017  FINDINGS:  There was no evidence of gastro-esophageal  reflux during 90 minutes of continuous observation.  Gastric emptying was within normal limits.  Gastric half emptying time is  55 minutes             EGD 1/29/2017  POSTOPERATIVE DIAGNOSES OR FINDINGS:  1.  Severe reflux esophagitis, likely etiology of prior hematemesis and    gastrointestinal bleeding.  2.  Erosive duodenitis, moderate severity.  3.  Nonerosive gastritis of mild severity, biopsied to evaluate for    Helicobacter pylori status.  4.  No evidence of Adele-Larson tear nor esophageal varices.    Problems:  1.  Severe erosive reflux esophagitis, likely etiology of prior hematemesis and (2.) upper gastrointestinal bleeding; No evidence of Adele-Larson tear nor esophageal varices.  3.  Alcohol abuse and alcoholism.  4.  Anemia from acute blood loss.  5.  Nausea  6.  Erosive duodenitis, moderate severity.  7.  Nonerosive gastritis of mild severity, biopsied to evaluate for Helicobacter pylori status.  8.  Overweight, BMI of 27  9.  Benign prostatic hypertrophy.  10.  Posttraumatic stress disorder  11. EKG from 01/28/2017, sinus rhythm with borderline prolonged QT interval of 392, QTc of 477.  12. history of alcohol withdrawal  13. cigarette smoker  14. Chronic back pain  15. Depression.  16. Tattooing    Recommendations:  1.  Avoid NSAIDs.  2.  Alcohol cessation, warned patient of health hazards and counseled him on cessation.  3.  Advance diet to cardiac diet  4.  Follow up biopsies and consider surveillance EGD in 6 months to a year if  Varma's esophagus is confirmed. Await pathology  5.  Prilosec 40 mg p.o. b.i.d. x8 weeks.  6.  Carafate 1 g q.i.d. suspension x14 days.  7. Will sign off. [facility], [facility]. If needed, [facility] can be [facility] from [facility] to [facility] with us if necessary.    GI TO SIGN OFF / STAND BY - Thank you very much for allowing me to participate in the care of your patient.  Please feel free to contact me anytime at  [phone]      Core Measures

## 2017-01-30 NOTE — DIETARY
Nutrition Services: Update  Pt is on a full liquid diet. Attempted to visit pt, pt was busy with other discipline. Per chart pt PO %. Wt on 1/29 - 91.2 kg via bed scale.     Pertinent Labs: Na 134, BUN 6, Phosphorus 1.7  Pertinent Meds: campral, lexapro, prilosec, carafate, thiamine, MVI, folvite, desyrel, ativan (prn)  Fluids: 0.9% NaCl with KCl 20 mEq infusion @ 100 ml/hr   Skin: No skin breakdown noted in chart  GI: Last BM 1/29    PLAN/RECOMMEND -   1) Nutrition rep to see patient daily for meal and snack preferences.  2) Encourage PO  3) Weekly weights to monitor fluid and nutrition status  4) Obtain supplement order per RD as needed.    RD available prn

## 2017-01-30 NOTE — PROGRESS NOTES
Report received. Care assumed. Patient A&Ox4. CIWA scored, medication given. Patient re-educated that he can't leave the floor. Pt denies any needs.Discussed POC for the day with Pt. Call light within reach, encouraged pt to call for assistance.

## 2017-01-30 NOTE — CARE PLAN
Problem: Safety  Goal: Will remain free from injury  Intervention: Provide assistance with mobility  Fall risk assessed every shift and fall precautions in place.  Pt educated to not get up without assistance.  Verbalized understanding.       Problem: Knowledge Deficit  Goal: Knowledge of disease process/condition, treatment plan, diagnostic tests, and medications will improve  Intervention: Assess knowledge level of disease process/condition, treatment plan, diagnostic tests, and medications  Pt educated regarding activity, diet, meds and plan of care. Verbalized understanding.

## 2017-01-30 NOTE — CARE PLAN
Problem: Nutritional:  Goal: Achieve adequate nutritional intake  Patient will consume 50% of meals   Outcome: MET Date Met:  01/30/17  Per chart pt PO %

## 2017-01-30 NOTE — PROGRESS NOTES
Pt found missing from room. Not in bathroom and not on tele floor. Security called. Pt found smoking a cigarette outside first floor of [facility] by charge nurse Martin. Patient was found to have taken batteries out of tele box and walked with charge nurse back up to pt room. Pt educated about importance of staying on telemetry box and telemetry floor for treatment, monitoring, and safety. Hospitalist paged and notified of pt leaving floor. New orders received for nicotine gum. Pt cooperative and agrees to stay on tele box and floor. Will continue to monitor pt.

## 2017-01-31 NOTE — DISCHARGE SUMMARY
DATE OF DISCHARGE:  01/30/2017    CONSULTANT:  Dr. Betancur/Monica, GI.    DISCHARGE DIAGNOSES:  1.  Hematemesis from severe reflux esophagitis and erosive duodenitis.  2.  Hypokalemia.  3.  Anion gap metabolic acidosis.  4.  History of alcohol abuse.    HOSPITAL SUMMARY:  Please refer for H and P for details.  In short, patient is   a 46 years old male with history of alcoholism, who has been sober for about   2 months, came in ER with vomiting of blood.  The patient states over the past   4 days, he has been drinking vodka and he developed hematemesis.  So, he came   to ER for checkup.  In the ER, he was consulted with gastroenterologist and   patient was further admitted to ICU for further evaluation.  Patient had   endoscopy done on January 28th and found to have severe reflux esophagitis and   erosive duodenitis and nonerosive gastritis with no evidence of Adele-Larson   tear, no esophageal varices.  Since the procedure, his hemoglobin has been   stabilized.  Patient was started on PPI and sucralfate and per GI, patient   need to take them after he get discharged.  Also, biopsy was taken and   currently pending.  Per GI, patient can be discharged home in stable   condition.  Patient will follow up with GI doctor in a week or so to follow up   on the biopsy report.    DISCHARGE MEDICATIONS:  Omeprazole 40 mg b.i.d. for at least 8 weeks,   sucralfate 1 g 4 times a day for 14 days, calcium   carbonate 1000 mg q. day, lisinopril 10 mg q. day, hydralazine 25 mg t.i.d.,   prazosin 6 mg at bedtime, thiamine 100 mg q. day, tramadol 100 mg q. 6 hour   p.r.n., trazodone 150 mg at bedtime.    DISCHARGE INSTRUCTIONS:  The patient was instructed to return to ER in the   event of worsening symptom.    FOLLOWUP:  Follow up primary with PCP in 1 week.  Follow up with Dr. Anderson in   1-2 weeks.    PENDING STUDIES:  Stomach biopsy.    Time spent on discharge, 36 minutes.       ____________________________________     MADY ESPINOZA,  MD RAMOS / CHARLES    DD:  01/30/2017 12:51:36  DT:  01/31/2017 04:39:51    D#:  [id number]  Job#:  [id number]

## 2017-01-31 NOTE — PROGRESS NOTES
Patient returned to unit. Stated his wife received my voicemail and had him return. PIV was removed by patient. Discharge instructions given prior to leaving unit including when to see PCP and follow-up with GI. RX given to patient. All questions answered. Verbalized understanding.

## 2017-03-24 LAB — EKG IMPRESSION: NORMAL

## 2021-05-12 ENCOUNTER — HOSPITAL ENCOUNTER (OUTPATIENT)
Dept: RADIOLOGY | Facility: MEDICAL CENTER | Age: 51
End: 2021-05-12
Payer: COMMERCIAL

## (undated) DEVICE — BITE BLOCK ADULT 60FR (100EA/CA)

## (undated) DEVICE — GLOVE BIOGEL INDICATOR SZ 7SURGICAL PF LTX - (50/BX 4BX/CA)

## (undated) DEVICE — FILM CASSETTE ENDO

## (undated) DEVICE — LACTATED RINGERS INJ 1000 ML - (14EA/CA 60CA/PF)

## (undated) DEVICE — ELECTRODE 850 FOAM ADHESIVE - HYDROGEL RADIOTRNSPRNT (50/PK)

## (undated) DEVICE — LEAD SET 6 DISP. EKG NIHON KOHDEN (100EA/CA) [9859].

## (undated) DEVICE — SET LEADWIRE 5 LEAD BEDSIDE DISPOSABLE ECG (1SET OF 5/EA)

## (undated) DEVICE — SET EXTENSION WITH 2 PORTS (48EA/CA) ***PART #2C8610 IS A SUBSTITUTE*****

## (undated) DEVICE — KIT ANESTHESIA W/CIRCUIT & 3/LT BAG W/FILTER (20EA/CA)

## (undated) DEVICE — DEVICE HEMOSTATIC CLIPPING RESOLUTION 360 DEGREES (20EA/BX)

## (undated) DEVICE — CONTAINER, SPECIMEN, STERILE

## (undated) DEVICE — PROTECTOR ULNA NERVE - (36PR/CA)

## (undated) DEVICE — KIT CUSTOM PROCEDURE SINGLE FOR ENDO  (15/CA)

## (undated) DEVICE — NEPTUNE 4 PORT MANIFOLD - (20/PK)

## (undated) DEVICE — MASK ANESTHESIA ADULT  - (100/CA)

## (undated) DEVICE — FORCEP RADIAL JAW 4 STANDARD CAPACITY W/NEEDLE 240CM (40EA/BX)

## (undated) DEVICE — BOVIE BLADE COATED - (50/PK)

## (undated) DEVICE — GLOVE BIOGEL SZ 6.5 SURGICAL PF LTX (50PR/BX 4BX/CA)

## (undated) DEVICE — SENSOR SPO2 NEO LNCS ADHESIVE (20/BX) SEE USER NOTES

## (undated) DEVICE — CANISTER SUCTION 3000ML MECHANICAL FILTER AUTO SHUTOFF MEDI-VAC NONSTERILE LF DISP  (40EA/CA)

## (undated) DEVICE — TUBING CLEARLINK DUO-VENT - C-FLO (48EA/CA)

## (undated) DEVICE — HEAD HOLDER JUNIOR/ADULT

## (undated) DEVICE — KIT  I.V. START (100EA/CA)

## (undated) DEVICE — CORDS BIPOLAR COAGULATION - 12FT STERILE DISP. (10EA/BX)

## (undated) DEVICE — KIT ROOM DECONTAMINATION

## (undated) DEVICE — SUCTION INSTRUMENT YANKAUER BULBOUS TIP W/O VENT (50EA/CA)

## (undated) DEVICE — CANNULA W/ SUPPLY TUBING O2 - (50/CA)

## (undated) DEVICE — GLOVE BIOGEL ECLIPSE  PF LATEX SIZE 6.5 (50PR/BX)

## (undated) DEVICE — GOWN WARMING STANDARD FLEX - (30/CA)